# Patient Record
Sex: FEMALE | Race: WHITE | NOT HISPANIC OR LATINO | Employment: OTHER | URBAN - NONMETROPOLITAN AREA
[De-identification: names, ages, dates, MRNs, and addresses within clinical notes are randomized per-mention and may not be internally consistent; named-entity substitution may affect disease eponyms.]

---

## 2017-01-04 ENCOUNTER — OFFICE VISIT (OUTPATIENT)
Dept: OTOLARYNGOLOGY | Facility: CLINIC | Age: 77
End: 2017-01-04

## 2017-01-04 VITALS
HEART RATE: 100 BPM | TEMPERATURE: 98.2 F | DIASTOLIC BLOOD PRESSURE: 88 MMHG | HEIGHT: 64 IN | WEIGHT: 98 LBS | SYSTOLIC BLOOD PRESSURE: 121 MMHG | BODY MASS INDEX: 16.73 KG/M2

## 2017-01-04 DIAGNOSIS — M35.00 SICCA (HCC): Primary | ICD-10-CM

## 2017-01-04 DIAGNOSIS — K11.7 XEROSTOMIA: ICD-10-CM

## 2017-01-04 DIAGNOSIS — L43.9 LICHEN PLANUS: ICD-10-CM

## 2017-01-04 DIAGNOSIS — K12.1 MOUTH ULCERS: ICD-10-CM

## 2017-01-04 PROCEDURE — 1036F TOBACCO NON-USER: CPT | Performed by: OTOLARYNGOLOGY

## 2017-01-04 PROCEDURE — 99213 OFFICE O/P EST LOW 20 MIN: CPT | Performed by: OTOLARYNGOLOGY

## 2017-01-04 NOTE — MR AVS SNAPSHOT
Olive Bowman   1/4/2017 9:15 AM   Office Visit    Dept Phone:  [phone]   Encounter #:  [id number]    Provider:  Arnel Amaral MD   Department:  [facility]                Your Full Care Plan              Your Updated Medication List          This list is accurate as of: 1/4/17 11:12 AM.  Always use your most recent med list.                ALPRAZolam 0.5 MG tablet   Commonly known as:  XANAX       cholecalciferol 1000 UNITS tablet   Commonly known as:  VITAMIN D3       dexlansoprazole 60 MG capsule   Commonly known as:  DEXILANT       estradiol 1 MG tablet   Commonly known as:  ESTRACE       HYDROcodone-acetaminophen 7.5-325 MG per tablet   Commonly known as:  NORCO       latanoprost 0.005 % ophthalmic solution   Commonly known as:  XALATAN       lidocaine-Maalox-diphenhydramine 900 mL suspension   Commonly known as:  MIRACLE MOUTHWASH       orphenadrine 100 MG 12 hr tablet   Commonly known as:  NORFLEX       pilocarpine 5 MG tablet   Commonly known as:  SALAGEN       PROBIOTIC PO       vitamin B-12 1000 MCG tablet   Commonly known as:  CYANOCOBALAMIN       vitamin C 500 MG tablet   Commonly known as:  ASCORBIC ACID       vitamin D 52167 UNITS capsule capsule   Commonly known as:  ERGOCALCIFEROL       vitamin E 1000 UNIT capsule               You Were Diagnosed With        Codes Comments    Sicca    -  Primary ICD-10-CM: M35.00  ICD-9-CM: 710.2     Mouth ulcers     ICD-10-CM: K12.1  ICD-9-CM: 528.9     Xerostomia     ICD-10-CM: K11.7  ICD-9-CM: 527.7     Lichen planus     ICD-10-CM: L43.9  ICD-9-CM: 697.0       Instructions     None    Patient Instructions History      Upcoming Appointments     Visit Type Date Time Department    FOLLOW UP 1/4/2017  9:15 AM [facility] TAYLA Lopezt Signup     Our records indicate that you have declined [facility] MyChart signup. If you would like to sign up for MyChart, please email [email or web address] or call  "[phone] to obtain an activation code.             Other Info from Your Visit           Allergies     Latex        Reason for Visit     Follow-up Biopsy Follow Up      Vital Signs     Blood Pressure Pulse Temperature Height Weight Body Mass Index    121/88 100 98.2 °F (36.8 °C) 64\" (162.6 cm) 98 lb (44.5 kg) 16.82 kg/m2    Smoking Status                   Former Smoker           Problems and Diagnoses Noted     Skin inflammation    Mouth ulcers    Sicca    Disturbance of salivary secretion        "

## 2017-01-04 NOTE — PROGRESS NOTES
PRIMARY CARE PROVIDER: Paulino Ortiz MD  REFERRING PROVIDER: No ref. provider found    Chief Complaint   Patient presents with   • Follow-up     Biopsy Follow Up       Subjective   History of Present Illness:  Olive Bowman is a  76 y.o.  female who presents for follow-up of her minor salivary gland biopsy.  This was negative for Sjogren's disease.  She has had issues with severe yeast infections and vaginitis, skin lesions, and recurring mouth sores.  She has had previous biopsies of her skin lesions that if shown lichen planus.  She currently has a dry mouth but no oral lesions.    Review of Systems:  Review of Systems   Constitutional: Positive for activity change, appetite change and fatigue. Negative for chills and fever.   HENT:        See HPI   Respiratory: Positive for cough. Negative for shortness of breath.    Cardiovascular: Negative for palpitations.   Gastrointestinal: Positive for nausea. Negative for vomiting.   Allergic/Immunologic: Negative.    Neurological: Positive for headaches. Negative for dizziness.   Psychiatric/Behavioral: Negative.        Past History:  Past Medical History   Diagnosis Date   • Arthralgia    • Fibromyalgia    • Lichen planus    • Mouth ulcers 11/18/2016   • Osteoporosis    • Primary osteoarthritis of both hands    • Sicca    • Ulcerative colitis    • Vitamin D deficiency    • Xerostomia 11/19/2016     Past Surgical History   Procedure Laterality Date   • Hysterectomy     • Breast lumpectomy     • Cholecystectomy       Family History   Problem Relation Age of Onset   • Cancer Mother    • Diabetes Paternal Grandmother    • Diabetes Paternal Grandfather      Social History   Substance Use Topics   • Smoking status: Former Smoker     Quit date: 1979   • Smokeless tobacco: Never Used   • Alcohol use 2.4 oz/week     4 Glasses of wine per week       Current Outpatient Prescriptions:   •  ALPRAZolam (XANAX) 0.5 MG tablet, Take 0.5 mg by mouth 2 (Two) Times a Day  As Needed for anxiety., Disp: , Rfl:   •  cholecalciferol (VITAMIN D3) 1000 UNITS tablet, Take 1,000 Units by mouth Daily., Disp: , Rfl:   •  dexlansoprazole (DEXILANT) 60 MG capsule, Take 60 mg by mouth Daily., Disp: , Rfl:   •  estradiol (ESTRACE) 1 MG tablet, Take 1 mg by mouth Daily., Disp: , Rfl:   •  HYDROcodone-acetaminophen (NORCO) 7.5-325 MG per tablet, Take 1 tablet by mouth Every 6 (Six) Hours As Needed for moderate pain (4-6)., Disp: , Rfl:   •  latanoprost (XALATAN) 0.005 % ophthalmic solution, 1 drop Every Night., Disp: , Rfl:   •  lidocaine-Maalox-diphenhydramine (MIRACLE MOUTHWASH) 900 mL suspension, Take 30 mL by mouth 4 (Four) Times a Day As Needed., Disp: , Rfl:   •  orphenadrine (NORFLEX) 100 MG 12 hr tablet, Take 100 mg by mouth 2 (Two) Times a Day., Disp: , Rfl:   •  pilocarpine (SALAGEN) 5 MG tablet, Take 5 mg by mouth 3 (Three) Times a Day., Disp: , Rfl:   •  Probiotic Product (PROBIOTIC PO), Take  by mouth., Disp: , Rfl:   •  vitamin B-12 (CYANOCOBALAMIN) 1000 MCG tablet, Take 1,000 mcg by mouth Daily., Disp: , Rfl:   •  vitamin C (ASCORBIC ACID) 500 MG tablet, Take 1,000 mg by mouth Daily., Disp: , Rfl:   •  vitamin D (ERGOCALCIFEROL) 02687 UNITS capsule capsule, Take 50,000 Units by mouth Every 30 (Thirty) Days., Disp: , Rfl:   •  vitamin E 1000 UNIT capsule, Take 1,000 Units by mouth Daily., Disp: , Rfl:   Allergies:  Latex    Objective     Vital Signs:  Temp:  [98.2 °F (36.8 °C)] 98.2 °F (36.8 °C)  Heart Rate:  [100] 100  BP: (121)/(88) 121/88    Physical Exam:  CONSTITUTIONAL: well nourished, well-developed, alert, oriented, in no acute distress  COMMUNICATION AND VOICE: able to communicate normally for age, normal voice quality  HEAD: normocephalic, no lesions, atraumatic, no tenderness, no masses  FACE: appearance normal, no lesions, no tenderness, no deformities, facial motion symmetric  EYES: ocular motility normal, eyelids normal, orbits normal, no proptosis, conjunctiva normal ,  pupils equal, round  EARS:  Hearing: response to conversational voice normal bilaterally  External Ears: auricles without lesions  NOSE:  External Nose: structure normal, no tenderness on palpation, no nasal discharge, no lesions, no evidence of trauma, nostrils patent  ORAL:  Lips: upper and lower lips without lesion, incision site is well-healed  Oral cavity: The mouth is very dry.  There is no mucosal lesions seen.  NECK: neck appearance normal  CHEST/RESPIRATORY: respiratory effort normal, normal chest excursion  CARDIOVASCULAR: extremities without cyanosis or edema  NEUROLOGIC/PSYCHIATRIC: oriented appropriately, mood normal, affect appropriate, CN II-XII intact grossly      Results Review:   The pathology report was reviewed and showed no evidence of Sjogren's disease.      Assessment   1. Sicca    2. Mouth ulcers    3. Xerostomia    4. Lichen planus        Plan   Conservative management.  She has no Sjogren's disease on the minor salivary gland biopsy.  She reports that Dr. Ortiz was wanting a biopsy of one of her mouth ulcerations to rule out lichen planus.  She has had a dermatologic biopsy in the past that did confirm lichen planus.  He currently does not have mouth ulcerations.  I attempted to discuss this with Dr. Ortiz but he was not available.  For the time being we will watch this and if there is an ulcer that comes up and if Dr. Herrera wants it biopsied, we will certainly be able to do that.  However I feel that this more likely represents lichen planus given her previous dermatologic biopsy.      Arnel Amaral MD  01/04/17  10:20 AM

## 2017-04-19 ENCOUNTER — TRANSCRIBE ORDERS (OUTPATIENT)
Dept: ADMINISTRATIVE | Facility: HOSPITAL | Age: 77
End: 2017-04-19

## 2017-04-19 DIAGNOSIS — Z12.31 VISIT FOR SCREENING MAMMOGRAM: Primary | ICD-10-CM

## 2018-05-25 ENCOUNTER — TRANSCRIBE ORDERS (OUTPATIENT)
Dept: ADMINISTRATIVE | Facility: HOSPITAL | Age: 78
End: 2018-05-25

## 2018-05-25 DIAGNOSIS — Z12.31 ENCOUNTER FOR SCREENING MAMMOGRAM FOR MALIGNANT NEOPLASM OF BREAST: Primary | ICD-10-CM

## 2018-05-25 DIAGNOSIS — Z78.0 POSTMENOPAUSAL: ICD-10-CM

## 2018-06-01 ENCOUNTER — APPOINTMENT (OUTPATIENT)
Dept: BONE DENSITY | Facility: HOSPITAL | Age: 78
End: 2018-06-01
Attending: INTERNAL MEDICINE

## 2018-06-01 ENCOUNTER — APPOINTMENT (OUTPATIENT)
Dept: MAMMOGRAPHY | Facility: HOSPITAL | Age: 78
End: 2018-06-01
Attending: INTERNAL MEDICINE

## 2018-07-13 ENCOUNTER — HOSPITAL ENCOUNTER (OUTPATIENT)
Dept: MAMMOGRAPHY | Facility: HOSPITAL | Age: 78
Discharge: HOME | End: 2018-07-13
Attending: INTERNAL MEDICINE | Admitting: INTERNAL MEDICINE

## 2018-07-13 ENCOUNTER — HOSPITAL ENCOUNTER (OUTPATIENT)
Dept: BONE DENSITY | Facility: HOSPITAL | Age: 78
Discharge: HOME | End: 2018-07-13
Attending: INTERNAL MEDICINE

## 2018-07-13 DIAGNOSIS — Z12.31 ENCOUNTER FOR SCREENING MAMMOGRAM FOR MALIGNANT NEOPLASM OF BREAST: ICD-10-CM

## 2018-07-13 DIAGNOSIS — Z78.0 POSTMENOPAUSAL: ICD-10-CM

## 2018-07-13 PROCEDURE — 77080 DXA BONE DENSITY AXIAL: CPT

## 2018-07-13 PROCEDURE — 77063 BREAST TOMOSYNTHESIS BI: CPT

## 2018-07-13 PROCEDURE — 77067 SCR MAMMO BI INCL CAD: CPT

## 2018-09-05 ENCOUNTER — OFFICE VISIT (OUTPATIENT)
Dept: GASTROENTEROLOGY | Facility: CLINIC | Age: 78
End: 2018-09-05

## 2018-09-05 VITALS
WEIGHT: 98 LBS | SYSTOLIC BLOOD PRESSURE: 138 MMHG | OXYGEN SATURATION: 95 % | HEIGHT: 64 IN | BODY MASS INDEX: 16.73 KG/M2 | DIASTOLIC BLOOD PRESSURE: 72 MMHG | HEART RATE: 75 BPM

## 2018-09-05 DIAGNOSIS — R10.13 DYSPEPSIA: Primary | ICD-10-CM

## 2018-09-05 DIAGNOSIS — Z78.9 NONSMOKER: ICD-10-CM

## 2018-09-05 PROCEDURE — 99212 OFFICE O/P EST SF 10 MIN: CPT | Performed by: CLINICAL NURSE SPECIALIST

## 2018-09-05 RX ORDER — ALUMINA, MAGNESIA, AND SIMETHICONE 2400; 2400; 240 MG/30ML; MG/30ML; MG/30ML
SUSPENSION ORAL EVERY 6 HOURS PRN
Status: ON HOLD | COMMUNITY
End: 2019-06-07

## 2018-09-05 RX ORDER — OMEPRAZOLE 20 MG/1
20 CAPSULE, DELAYED RELEASE ORAL DAILY
Qty: 30 CAPSULE | Refills: 11 | Status: ON HOLD | OUTPATIENT
Start: 2018-09-05 | End: 2019-06-17

## 2018-09-05 NOTE — PROGRESS NOTES
Olive Bowman  1940    9/5/2018  Chief Complaint   Patient presents with   • GI Problem     Colitis     Subjective   HPI  Olive Bowman is a 77 y.o. female who presents with a recent flare of her reflux and indigestion. She says that it is better at this time. She is not taking anything for this at this time. She takes Gaviscon as needed and this helps her.  She only takes her PPI as needed. No nausea or vomiting. No dysphagia. She also struggles with Lichen planus. This is fairly controlled at this time. She does struggle with her weight and in continuing to supplement with Boost and shakes. She is working with her PCP regarding this as well.   Past Medical History:   Diagnosis Date   • Arthralgia    • Fibromyalgia    • Lichen planus    • Mouth ulcers 11/18/2016   • Osteoporosis    • Primary osteoarthritis of both hands    • Sicca (CMS/HCC)    • Ulcerative colitis (CMS/HCC)    • Vitamin D deficiency    • Xerostomia 11/19/2016     Past Surgical History:   Procedure Laterality Date   • BREAST EXCISIONAL BIOPSY Right     x 2 benign   • BREAST EXCISIONAL BIOPSY Left     x 1 benign   • BREAST LUMPECTOMY     • CHOLECYSTECTOMY     • COLONOSCOPY W/ BIOPSIES  05/22/2013    Changes consistent with collagenous colitis   • ENDOSCOPY  02/04/2015    Dilated normal exam   • HYSTERECTOMY       Outpatient Prescriptions Marked as Taking for the 9/5/18 encounter (Office Visit) with Neela Hurst APRN   Medication Sig Dispense Refill   • ALPRAZolam (XANAX) 0.5 MG tablet Take 0.5 mg by mouth 2 (Two) Times a Day As Needed for anxiety.     • aluminum-magnesium hydroxide-simethicone (MAALOX MAX) 400-400-40 MG/5ML suspension Take  by mouth Every 6 (Six) Hours As Needed for Indigestion or Heartburn.     • estradiol (ESTRACE) 1 MG tablet Take 1 mg by mouth Daily.     • HYDROcodone-acetaminophen (NORCO) 7.5-325 MG per tablet Take 1 tablet by mouth Every 6 (Six) Hours As Needed for moderate pain (4-6).     • latanoprost  (XALATAN) 0.005 % ophthalmic solution 1 drop Every Night.     • lidocaine-Maalox-diphenhydramine (MIRACLE MOUTHWASH) 900 mL suspension Take 30 mL by mouth 4 (Four) Times a Day As Needed.     • orphenadrine (NORFLEX) 100 MG 12 hr tablet Take 100 mg by mouth 2 (Two) Times a Day.     • pilocarpine (SALAGEN) 5 MG tablet Take 5 mg by mouth 3 (Three) Times a Day.     • Probiotic Product (PROBIOTIC PO) Take  by mouth.     • vitamin B-12 (CYANOCOBALAMIN) 1000 MCG tablet Take 1,000 mcg by mouth Daily.     • vitamin C (ASCORBIC ACID) 500 MG tablet Take 1,000 mg by mouth Daily.     • vitamin D (ERGOCALCIFEROL) 45237 UNITS capsule capsule Take 50,000 Units by mouth Every 30 (Thirty) Days.     • vitamin E 1000 UNIT capsule Take 1,000 Units by mouth Daily.       Allergies   Allergen Reactions   • Latex      Social History     Social History   • Marital status:      Spouse name: N/A   • Number of children: N/A   • Years of education: N/A     Occupational History   • Not on file.     Social History Main Topics   • Smoking status: Former Smoker     Quit date: 1979   • Smokeless tobacco: Never Used   • Alcohol use 2.4 oz/week     4 Glasses of wine per week   • Drug use: Unknown   • Sexual activity: Not on file     Other Topics Concern   • Not on file     Social History Narrative   • No narrative on file     Family History   Problem Relation Age of Onset   • Cancer Mother    • Diabetes Paternal Grandmother    • Diabetes Paternal Grandfather    • Breast cancer Neg Hx    • Colon cancer Neg Hx    • Colon polyps Neg Hx      Health Maintenance   Topic Date Due   • TDAP/TD VACCINES (1 - Tdap) 10/23/1961   • ZOSTER VACCINE (1 of 2) 10/23/1992   • PNEUMOCOCCAL VACCINES (65+ LOW/MEDIUM RISK) (2 of 2 - PPSV23) 03/24/2017   • INFLUENZA VACCINE  08/01/2018   • MEDICARE ANNUAL WELLNESS  05/25/2019   • DXA SCAN  07/13/2020   • COLONOSCOPY  05/22/2023     Review of Systems   Constitutional: Negative for activity change, appetite change,  "chills, diaphoresis, fatigue, fever and unexpected weight change.   HENT: Negative for ear pain, hearing loss, mouth sores, sore throat, trouble swallowing and voice change.    Eyes: Negative.    Respiratory: Negative for cough, choking, shortness of breath and wheezing.    Cardiovascular: Negative for chest pain and palpitations.   Gastrointestinal: Negative for abdominal pain, blood in stool, constipation, diarrhea, nausea and vomiting.   Endocrine: Negative for cold intolerance and heat intolerance.   Genitourinary: Negative for decreased urine volume, dysuria, frequency, hematuria and urgency.   Musculoskeletal: Negative for back pain, gait problem and myalgias.   Skin: Negative for color change, pallor and rash.   Allergic/Immunologic: Negative for food allergies and immunocompromised state.   Neurological: Negative for dizziness, tremors, seizures, syncope, weakness, light-headedness, numbness and headaches.   Hematological: Negative for adenopathy. Does not bruise/bleed easily.   Psychiatric/Behavioral: Negative for agitation and confusion. The patient is not nervous/anxious.    All other systems reviewed and are negative.    Objective   Vitals:    09/05/18 0940   BP: 138/72   Pulse: 75   SpO2: 95%   Weight: 44.5 kg (98 lb)   Height: 162.6 cm (64\")     Body mass index is 16.82 kg/m².  Physical Exam   Constitutional: She is oriented to person, place, and time. She appears well-developed and well-nourished.   HENT:   Head: Normocephalic and atraumatic.   Eyes: Pupils are equal, round, and reactive to light.   Neck: Normal range of motion. Neck supple. No tracheal deviation present.   Cardiovascular: Normal rate, regular rhythm and normal heart sounds.  Exam reveals no gallop and no friction rub.    No murmur heard.  Pulmonary/Chest: Effort normal and breath sounds normal. No respiratory distress. She has no wheezes. She has no rales. She exhibits no tenderness.   Abdominal: Soft. Bowel sounds are normal. She " exhibits no distension. There is no hepatosplenomegaly. There is no tenderness. There is no rigidity, no rebound and no guarding.   Musculoskeletal: Normal range of motion. She exhibits no edema, tenderness or deformity.   Neurological: She is alert and oriented to person, place, and time. She has normal reflexes.   Skin: Skin is warm and dry. No rash noted. No pallor.   Psychiatric: She has a normal mood and affect. Her behavior is normal. Judgment and thought content normal.     Assessment/Plan   Olive was seen today for gi problem.    Diagnoses and all orders for this visit:    Dyspepsia  Comments:  she is stable at this time. she is to take Gaviscon as needed and Omeprazole if persistent for more than a few weeks.   Orders:  -     omeprazole (priLOSEC) 20 MG capsule; Take 1 capsule by mouth Daily.    Nonsmoker    She is to keep follow up with her PCP and rheumatologist.     EMR Dragon/transcription disclaimer: Much of this encounter note is electronic transcription/translation of spoken language to printed text. The electronic translation of spoken language may be erroneous, or at times, nonsensical words or phrases may be inadvertently transcribed. Although I have reviewed the note for such errors, some may still exist.  Body mass index is 16.82 kg/m².  No Follow-up on file.    Patient's Body mass index is 16.82 kg/m². BMI is below normal parameters. Recommendations include: referral to primary care.      All risks, benefits, alternatives, and indications of colonoscopy and/or Endoscopy procedure have been discussed with the patient. Risks to include perforation of the colon requiring possible surgery or colostomy, risk of bleeding from biopsies or removal of colon tissue, possibility of missing a colon polyp or cancer, or adverse drug reaction.  Benefits to include the diagnosis and management of disease of the colon and rectum. Alternatives to include barium enema, radiographic evaluation, lab testing or no  intervention. Pt verbalizes understanding and agrees.     Neela Ansley Hurst, APRN  9/5/2018  10:24 AM      Obesity, Adult  Obesity is the condition of having too much total body fat. Being overweight or obese means that your weight is greater than what is considered healthy for your body size. Obesity is determined by a measurement called BMI. BMI is an estimate of body fat and is calculated from height and weight. For adults, a BMI of 30 or higher is considered obese.  Obesity can eventually lead to other health concerns and major illnesses, including:  · Stroke.  · Coronary artery disease (CAD).  · Type 2 diabetes.  · Some types of cancer, including cancers of the colon, breast, uterus, and gallbladder.  · Osteoarthritis.  · High blood pressure (hypertension).  · High cholesterol.  · Sleep apnea.  · Gallbladder stones.  · Infertility problems.  What are the causes?  The main cause of obesity is taking in (consuming) more calories than your body uses for energy. Other factors that contribute to this condition may include:  · Being born with genes that make you more likely to become obese.  · Having a medical condition that causes obesity. These conditions include:  ¨ Hypothyroidism.  ¨ Polycystic ovarian syndrome (PCOS).  ¨ Binge-eating disorder.  ¨ Cushing syndrome.  · Taking certain medicines, such as steroids, antidepressants, and seizure medicines.  · Not being physically active (sedentary lifestyle).  · Living where there are limited places to exercise safely or buy healthy foods.  · Not getting enough sleep.  What increases the risk?  The following factors may increase your risk of this condition:  · Having a family history of obesity.  · Being a woman of -American descent.  · Being a man of  descent.  What are the signs or symptoms?  Having excessive body fat is the main symptom of this condition.  How is this diagnosed?  This condition may be diagnosed based on:  · Your symptoms.  · Your  medical history.  · A physical exam. Your health care provider may measure:  ¨ Your BMI. If you are an adult with a BMI between 25 and less than 30, you are considered overweight. If you are an adult with a BMI of 30 or higher, you are considered obese.  ¨ The distances around your hips and your waist (circumferences). These may be compared to each other to help diagnose your condition.  ¨ Your skinfold thickness. Your health care provider may gently pinch a fold of your skin and measure it.  How is this treated?  Treatment for this condition often includes changing your lifestyle. Treatment may include some or all of the following:  · Dietary changes. Work with your health care provider and a dietitian to set a weight-loss goal that is healthy and reasonable for you. Dietary changes may include eating:  ¨ Smaller portions. A portion size is the amount of a particular food that is healthy for you to eat at one time. This varies from person to person.  ¨ Low-calorie or low-fat options.  ¨ More whole grains, fruits, and vegetables.  · Regular physical activity. This may include aerobic activity (cardio) and strength training.  · Medicine to help you lose weight. Your health care provider may prescribe medicine if you are unable to lose 1 pound a week after 6 weeks of eating more healthily and doing more physical activity.  · Surgery. Surgical options may include gastric banding and gastric bypass. Surgery may be done if:  ¨ Other treatments have not helped to improve your condition.  ¨ You have a BMI of 40 or higher.  ¨ You have life-threatening health problems related to obesity.  Follow these instructions at home:     Eating and drinking     · Follow recommendations from your health care provider about what you eat and drink. Your health care provider may advise you to:  ¨ Limit fast foods, sweets, and processed snack foods.  ¨ Choose low-fat options, such as low-fat milk instead of whole milk.  ¨ Eat 5 or more  servings of fruits or vegetables every day.  ¨ Eat at home more often. This gives you more control over what you eat.  ¨ Choose healthy foods when you eat out.  ¨ Learn what a healthy portion size is.  ¨ Keep low-fat snacks on hand.  ¨ Avoid sugary drinks, such as soda, fruit juice, iced tea sweetened with sugar, and flavored milk.  ¨ Eat a healthy breakfast.  · Drink enough water to keep your urine clear or pale yellow.  · Do not go without eating for long periods of time (do not fast) or follow a fad diet. Fasting and fad diets can be unhealthy and even dangerous.  Physical Activity   · Exercise regularly, as told by your health care provider. Ask your health care provider what types of exercise are safe for you and how often you should exercise.  · Warm up and stretch before being active.  · Cool down and stretch after being active.  · Rest between periods of activity.  Lifestyle   · Limit the time that you spend in front of your TV, computer, or video game system.  · Find ways to reward yourself that do not involve food.  · Limit alcohol intake to no more than 1 drink a day for nonpregnant women and 2 drinks a day for men. One drink equals 12 oz of beer, 5 oz of wine, or 1½ oz of hard liquor.  General instructions   · Keep a weight loss journal to keep track of the food you eat and how much you exercise you get.  · Take over-the-counter and prescription medicines only as told by your health care provider.  · Take vitamins and supplements only as told by your health care provider.  · Consider joining a support group. Your health care provider may be able to recommend a support group.  · Keep all follow-up visits as told by your health care provider. This is important.  Contact a health care provider if:  · You are unable to meet your weight loss goal after 6 weeks of dietary and lifestyle changes.  This information is not intended to replace advice given to you by your health care provider. Make sure you discuss  any questions you have with your health care provider.  Document Released: 01/25/2006 Document Revised: 05/22/2017 Document Reviewed: 10/05/2016  [id number] Interactive Patient Education © 2017 [employer]      If you smoke or use tobacco, 4 minutes reading provided  Steps to Quit Smoking  Smoking tobacco can be harmful to your health and can affect almost every organ in your body. Smoking puts you, and those around you, at risk for developing many serious chronic diseases. Quitting smoking is difficult, but it is one of the best things that you can do for your health. It is never too late to quit.  What are the benefits of quitting smoking?  When you quit smoking, you lower your risk of developing serious diseases and conditions, such as:  · Lung cancer or lung disease, such as COPD.  · Heart disease.  · Stroke.  · Heart attack.  · Infertility.  · Osteoporosis and bone fractures.  Additionally, symptoms such as coughing, wheezing, and shortness of breath may get better when you quit. You may also find that you get sick less often because your body is stronger at fighting off colds and infections. If you are pregnant, quitting smoking can help to reduce your chances of having a baby of low birth weight.  How do I get ready to quit?  When you decide to quit smoking, create a plan to make sure that you are successful. Before you quit:  · Pick a date to quit. Set a date within the next two weeks to give you time to prepare.  · Write down the reasons why you are quitting. Keep this list in places where you will see it often, such as on your bathroom mirror or in your car or wallet.  · Identify the people, places, things, and activities that make you want to smoke (triggers) and avoid them. Make sure to take these actions:  ¨ Throw away all cigarettes at home, at work, and in your car.  ¨ Throw away smoking accessories, such as ashtrays and lighters.  ¨ Clean your car and make sure to empty the ashtray.  ¨ Clean your  home, including curtains and carpets.  · Tell your family, friends, and coworkers that you are quitting. Support from your loved ones can make quitting easier.  · Talk with your health care provider about your options for quitting smoking.  · Find out what treatment options are covered by your health insurance.  What strategies can I use to quit smoking?  Talk with your healthcare provider about different strategies to quit smoking. Some strategies include:  · Quitting smoking altogether instead of gradually lessening how much you smoke over a period of time. Research shows that quitting “cold turkey” is more successful than gradually quitting.  · Attending in-person counseling to help you build problem-solving skills. You are more likely to have success in quitting if you attend several counseling sessions. Even short sessions of 10 minutes can be effective.  · Finding resources and support systems that can help you to quit smoking and remain smoke-free after you quit. These resources are most helpful when you use them often. They can include:  ¨ Online chats with a counselor.  ¨ Telephone quitlines.  ¨ Printed self-help materials.  ¨ Support groups or group counseling.  ¨ Text messaging programs.  ¨ Mobile phone applications.  · Taking medicines to help you quit smoking. (If you are pregnant or breastfeeding, talk with your health care provider first.) Some medicines contain nicotine and some do not. Both types of medicines help with cravings, but the medicines that include nicotine help to relieve withdrawal symptoms. Your health care provider may recommend:  ¨ Nicotine patches, gum, or lozenges.  ¨ Nicotine inhalers or sprays.  ¨ Non-nicotine medicine that is taken by mouth.  Talk with your health care provider about combining strategies, such as taking medicines while you are also receiving in-person counseling. Using these two strategies together makes you more likely to succeed in quitting than if you used  either strategy on its own.  If you are pregnant or breastfeeding, talk with your health care provider about finding counseling or other support strategies to quit smoking. Do not take medicine to help you quit smoking unless told to do so by your health care provider.  What things can I do to make it easier to quit?  Quitting smoking might feel overwhelming at first, but there is a lot that you can do to make it easier. Take these important actions:  · Reach out to your family and friends and ask that they support and encourage you during this time. Call telephone quitlines, reach out to support groups, or work with a counselor for support.  · Ask people who smoke to avoid smoking around you.  · Avoid places that trigger you to smoke, such as bars, parties, or smoke-break areas at work.  · Spend time around people who do not smoke.  · Lessen stress in your life, because stress can be a smoking trigger for some people. To lessen stress, try:  ¨ Exercising regularly.  ¨ Deep-breathing exercises.  ¨ Yoga.  ¨ Meditating.  ¨ Performing a body scan. This involves closing your eyes, scanning your body from head to toe, and noticing which parts of your body are particularly tense. Purposefully relax the muscles in those areas.  · Download or purchase mobile phone or tablet apps (applications) that can help you stick to your quit plan by providing reminders, tips, and encouragement. There are many free apps, such as QuitGuide from the CDC (Centers for Disease Control and Prevention). You can find other support for quitting smoking (smoking cessation) through smokefree.gov and other websites.  How will I feel when I quit smoking?  Within the first 24 hours of quitting smoking, you may start to feel some withdrawal symptoms. These symptoms are usually most noticeable 2-3 days after quitting, but they usually do not last beyond 2-3 weeks. Changes or symptoms that you might experience include:  · Mood swings.  · Restlessness,  anxiety, or irritation.  · Difficulty concentrating.  · Dizziness.  · Strong cravings for sugary foods in addition to nicotine.  · Mild weight gain.  · Constipation.  · Nausea.  · Coughing or a sore throat.  · Changes in how your medicines work in your body.  · A depressed mood.  · Difficulty sleeping (insomnia).  After the first 2-3 weeks of quitting, you may start to notice more positive results, such as:  · Improved sense of smell and taste.  · Decreased coughing and sore throat.  · Slower heart rate.  · Lower blood pressure.  · Clearer skin.  · The ability to breathe more easily.  · Fewer sick days.  Quitting smoking is very challenging for most people. Do not get discouraged if you are not successful the first time. Some people need to make many attempts to quit before they achieve long-term success. Do your best to stick to your quit plan, and talk with your health care provider if you have any questions or concerns.  This information is not intended to replace advice given to you by your health care provider. Make sure you discuss any questions you have with your health care provider.  Document Released: 12/12/2002 Document Revised: 08/15/2017 Document Reviewed: 05/03/2016  [employer] Interactive Patient Education © 2017 [employer]

## 2018-10-02 LAB
ALBUMIN SERPL-MCNC: 4.5 G/DL (ref 3.5–5.2)
ALP BLD-CCNC: 76 U/L (ref 35–104)
ALT SERPL-CCNC: 12 U/L (ref 5–33)
ANION GAP SERPL CALCULATED.3IONS-SCNC: 13 MMOL/L (ref 7–19)
AST SERPL-CCNC: 20 U/L (ref 5–32)
BASOPHILS ABSOLUTE: 0.1 K/UL (ref 0–0.2)
BASOPHILS RELATIVE PERCENT: 1 % (ref 0–1)
BILIRUB SERPL-MCNC: <0.2 MG/DL (ref 0.2–1.2)
BUN BLDV-MCNC: 23 MG/DL (ref 8–23)
C-REACTIVE PROTEIN: 0.4 MG/DL (ref 0–0.5)
CALCIUM SERPL-MCNC: 9.7 MG/DL (ref 8.8–10.2)
CHLORIDE BLD-SCNC: 99 MMOL/L (ref 98–111)
CO2: 28 MMOL/L (ref 22–29)
CREAT SERPL-MCNC: 1 MG/DL (ref 0.5–0.9)
EOSINOPHILS ABSOLUTE: 0.2 K/UL (ref 0–0.6)
EOSINOPHILS RELATIVE PERCENT: 2.6 % (ref 0–5)
GFR NON-AFRICAN AMERICAN: 54
GLUCOSE BLD-MCNC: 97 MG/DL (ref 74–109)
HCT VFR BLD CALC: 34.4 % (ref 37–47)
HEMOGLOBIN: 11 G/DL (ref 12–16)
LYMPHOCYTES ABSOLUTE: 1.5 K/UL (ref 1.1–4.5)
LYMPHOCYTES RELATIVE PERCENT: 18.6 % (ref 20–40)
MCH RBC QN AUTO: 32.2 PG (ref 27–31)
MCHC RBC AUTO-ENTMCNC: 32 G/DL (ref 33–37)
MCV RBC AUTO: 100.6 FL (ref 81–99)
MONOCYTES ABSOLUTE: 0.6 K/UL (ref 0–0.9)
MONOCYTES RELATIVE PERCENT: 7.1 % (ref 0–10)
NEUTROPHILS ABSOLUTE: 5.6 K/UL (ref 1.5–7.5)
NEUTROPHILS RELATIVE PERCENT: 70.3 % (ref 50–65)
PDW BLD-RTO: 12.5 % (ref 11.5–14.5)
PLATELET # BLD: 197 K/UL (ref 130–400)
PMV BLD AUTO: 9.6 FL (ref 9.4–12.3)
POTASSIUM SERPL-SCNC: 4.9 MMOL/L (ref 3.5–5)
RBC # BLD: 3.42 M/UL (ref 4.2–5.4)
SEDIMENTATION RATE, ERYTHROCYTE: 8 MM/HR (ref 0–25)
SODIUM BLD-SCNC: 140 MMOL/L (ref 136–145)
TOTAL PROTEIN: 6.8 G/DL (ref 6.6–8.7)
WBC # BLD: 7.9 K/UL (ref 4.8–10.8)

## 2018-10-04 LAB — G-6-PD, QUANT: 11.4 U/G HB (ref 9.9–16.6)

## 2018-11-16 LAB
ALBUMIN SERPL-MCNC: 4.2 G/DL (ref 3.5–5.2)
ALP BLD-CCNC: 65 U/L (ref 35–104)
ALT SERPL-CCNC: 18 U/L (ref 5–33)
ANION GAP SERPL CALCULATED.3IONS-SCNC: 13 MMOL/L (ref 7–19)
AST SERPL-CCNC: 26 U/L (ref 5–32)
BASOPHILS ABSOLUTE: 0.1 K/UL (ref 0–0.2)
BASOPHILS RELATIVE PERCENT: 0.6 % (ref 0–1)
BILIRUB SERPL-MCNC: 0.4 MG/DL (ref 0.2–1.2)
BUN BLDV-MCNC: 33 MG/DL (ref 8–23)
CALCIUM SERPL-MCNC: 10.1 MG/DL (ref 8.8–10.2)
CHLORIDE BLD-SCNC: 98 MMOL/L (ref 98–111)
CO2: 28 MMOL/L (ref 22–29)
CREAT SERPL-MCNC: 1.2 MG/DL (ref 0.5–0.9)
EOSINOPHILS ABSOLUTE: 0.1 K/UL (ref 0–0.6)
EOSINOPHILS RELATIVE PERCENT: 0.9 % (ref 0–5)
GFR NON-AFRICAN AMERICAN: 44
GLUCOSE BLD-MCNC: 166 MG/DL (ref 74–109)
HCT VFR BLD CALC: 32.7 % (ref 37–47)
HEMOGLOBIN: 10.2 G/DL (ref 12–16)
LYMPHOCYTES ABSOLUTE: 1.5 K/UL (ref 1.1–4.5)
LYMPHOCYTES RELATIVE PERCENT: 17.9 % (ref 20–40)
MCH RBC QN AUTO: 32.6 PG (ref 27–31)
MCHC RBC AUTO-ENTMCNC: 31.2 G/DL (ref 33–37)
MCV RBC AUTO: 104.5 FL (ref 81–99)
MONOCYTES ABSOLUTE: 0.5 K/UL (ref 0–0.9)
MONOCYTES RELATIVE PERCENT: 5.9 % (ref 0–10)
NEUTROPHILS ABSOLUTE: 6.4 K/UL (ref 1.5–7.5)
NEUTROPHILS RELATIVE PERCENT: 74.4 % (ref 50–65)
PDW BLD-RTO: 12.9 % (ref 11.5–14.5)
PLATELET # BLD: 211 K/UL (ref 130–400)
PMV BLD AUTO: 9.5 FL (ref 9.4–12.3)
POTASSIUM SERPL-SCNC: 4.7 MMOL/L (ref 3.5–5)
RBC # BLD: 3.13 M/UL (ref 4.2–5.4)
SODIUM BLD-SCNC: 139 MMOL/L (ref 136–145)
TOTAL PROTEIN: 7 G/DL (ref 6.6–8.7)
VITAMIN B-12: 1306 PG/ML (ref 211–946)
WBC # BLD: 8.6 K/UL (ref 4.8–10.8)

## 2019-06-06 ENCOUNTER — TELEPHONE (OUTPATIENT)
Dept: GASTROENTEROLOGY | Facility: CLINIC | Age: 79
End: 2019-06-06

## 2019-06-06 ENCOUNTER — HOSPITAL ENCOUNTER (OUTPATIENT)
Facility: HOSPITAL | Age: 79
Discharge: HOME | End: 2019-06-07
Attending: FAMILY MEDICINE | Admitting: INTERNAL MEDICINE

## 2019-06-06 DIAGNOSIS — R13.10 ODYNOPHAGIA: ICD-10-CM

## 2019-06-06 DIAGNOSIS — E86.0 DEHYDRATION: Primary | ICD-10-CM

## 2019-06-06 DIAGNOSIS — R13.19 ESOPHAGEAL DYSPHAGIA: ICD-10-CM

## 2019-06-06 LAB
ALBUMIN SERPL-MCNC: 4.2 G/DL (ref 3.5–5)
ALBUMIN/GLOB SERPL: 1.4 G/DL (ref 1.1–2.5)
ALP SERPL-CCNC: 94 U/L (ref 24–120)
ALT SERPL W P-5'-P-CCNC: 37 U/L (ref 0–54)
ANION GAP SERPL CALCULATED.3IONS-SCNC: 8 MMOL/L (ref 4–13)
APTT PPP: 27.2 SECONDS (ref 24.1–35)
AST SERPL-CCNC: 39 U/L (ref 7–45)
BASOPHILS # BLD AUTO: 0.04 10*3/MM3 (ref 0–0.2)
BASOPHILS NFR BLD AUTO: 0.3 % (ref 0–2)
BILIRUB SERPL-MCNC: 0.3 MG/DL (ref 0.1–1)
BUN BLD-MCNC: 36 MG/DL (ref 5–21)
BUN/CREAT SERPL: 33 (ref 7–25)
CALCIUM SPEC-SCNC: 9.5 MG/DL (ref 8.4–10.4)
CHLORIDE SERPL-SCNC: 94 MMOL/L (ref 98–110)
CO2 SERPL-SCNC: 33 MMOL/L (ref 24–31)
CREAT BLD-MCNC: 1.09 MG/DL (ref 0.5–1.4)
DEPRECATED RDW RBC AUTO: 47 FL (ref 40–54)
EOSINOPHIL # BLD AUTO: 0.01 10*3/MM3 (ref 0–0.7)
EOSINOPHIL NFR BLD AUTO: 0.1 % (ref 0–4)
ERYTHROCYTE [DISTWIDTH] IN BLOOD BY AUTOMATED COUNT: 12.4 % (ref 12–15)
GFR SERPL CREATININE-BSD FRML MDRD: 49 ML/MIN/1.73
GLOBULIN UR ELPH-MCNC: 3 GM/DL
GLUCOSE BLD-MCNC: 66 MG/DL (ref 70–100)
HCT VFR BLD AUTO: 40.6 % (ref 37–47)
HGB BLD-MCNC: 13.1 G/DL (ref 12–16)
IMM GRANULOCYTES # BLD AUTO: 0.17 10*3/MM3 (ref 0–0.05)
IMM GRANULOCYTES NFR BLD AUTO: 1.2 % (ref 0–5)
INR PPP: 0.91 (ref 0.91–1.09)
LIPASE SERPL-CCNC: 64 U/L (ref 23–203)
LYMPHOCYTES # BLD AUTO: 1.02 10*3/MM3 (ref 0.72–4.86)
LYMPHOCYTES NFR BLD AUTO: 6.9 % (ref 15–45)
MCH RBC QN AUTO: 33 PG (ref 28–32)
MCHC RBC AUTO-ENTMCNC: 32.3 G/DL (ref 33–36)
MCV RBC AUTO: 102.3 FL (ref 82–98)
MONOCYTES # BLD AUTO: 1.05 10*3/MM3 (ref 0.19–1.3)
MONOCYTES NFR BLD AUTO: 7.1 % (ref 4–12)
NEUTROPHILS # BLD AUTO: 12.48 10*3/MM3 (ref 1.87–8.4)
NEUTROPHILS NFR BLD AUTO: 84.4 % (ref 39–78)
NRBC BLD AUTO-RTO: 0 /100 WBC (ref 0–0.2)
PLATELET # BLD AUTO: 281 10*3/MM3 (ref 130–400)
PMV BLD AUTO: 8.8 FL (ref 6–12)
POTASSIUM BLD-SCNC: 3.7 MMOL/L (ref 3.5–5.3)
PROT SERPL-MCNC: 7.2 G/DL (ref 6.3–8.7)
PROTHROMBIN TIME: 12.5 SECONDS (ref 11.9–14.6)
RBC # BLD AUTO: 3.97 10*6/MM3 (ref 4.2–5.4)
SODIUM BLD-SCNC: 135 MMOL/L (ref 135–145)
WBC NRBC COR # BLD: 14.77 10*3/MM3 (ref 4.8–10.8)

## 2019-06-06 PROCEDURE — 83690 ASSAY OF LIPASE: CPT | Performed by: FAMILY MEDICINE

## 2019-06-06 PROCEDURE — 96376 TX/PRO/DX INJ SAME DRUG ADON: CPT

## 2019-06-06 PROCEDURE — 80053 COMPREHEN METABOLIC PANEL: CPT | Performed by: FAMILY MEDICINE

## 2019-06-06 PROCEDURE — 25010000002 HYDROMORPHONE PER 4 MG: Performed by: INTERNAL MEDICINE

## 2019-06-06 PROCEDURE — 84100 ASSAY OF PHOSPHORUS: CPT | Performed by: INTERNAL MEDICINE

## 2019-06-06 PROCEDURE — 93010 ELECTROCARDIOGRAM REPORT: CPT | Performed by: INTERNAL MEDICINE

## 2019-06-06 PROCEDURE — 99284 EMERGENCY DEPT VISIT MOD MDM: CPT

## 2019-06-06 PROCEDURE — 96361 HYDRATE IV INFUSION ADD-ON: CPT

## 2019-06-06 PROCEDURE — 84484 ASSAY OF TROPONIN QUANT: CPT | Performed by: INTERNAL MEDICINE

## 2019-06-06 PROCEDURE — G0378 HOSPITAL OBSERVATION PER HR: HCPCS

## 2019-06-06 PROCEDURE — 85610 PROTHROMBIN TIME: CPT | Performed by: FAMILY MEDICINE

## 2019-06-06 PROCEDURE — 85730 THROMBOPLASTIN TIME PARTIAL: CPT | Performed by: FAMILY MEDICINE

## 2019-06-06 PROCEDURE — 96374 THER/PROPH/DIAG INJ IV PUSH: CPT

## 2019-06-06 PROCEDURE — 96375 TX/PRO/DX INJ NEW DRUG ADDON: CPT

## 2019-06-06 PROCEDURE — 85025 COMPLETE CBC W/AUTO DIFF WBC: CPT | Performed by: FAMILY MEDICINE

## 2019-06-06 PROCEDURE — 25010000002 LORAZEPAM PER 2 MG: Performed by: INTERNAL MEDICINE

## 2019-06-06 PROCEDURE — 93005 ELECTROCARDIOGRAM TRACING: CPT | Performed by: FAMILY MEDICINE

## 2019-06-06 PROCEDURE — 83735 ASSAY OF MAGNESIUM: CPT | Performed by: INTERNAL MEDICINE

## 2019-06-06 RX ORDER — LORAZEPAM 2 MG/ML
0.5 INJECTION INTRAMUSCULAR EVERY 6 HOURS PRN
Status: DISCONTINUED | OUTPATIENT
Start: 2019-06-06 | End: 2019-06-07 | Stop reason: HOSPADM

## 2019-06-06 RX ORDER — FAMOTIDINE 10 MG/ML
20 INJECTION, SOLUTION INTRAVENOUS EVERY 12 HOURS SCHEDULED
Status: DISCONTINUED | OUTPATIENT
Start: 2019-06-06 | End: 2019-06-07 | Stop reason: HOSPADM

## 2019-06-06 RX ORDER — SODIUM CHLORIDE 0.9 % (FLUSH) 0.9 %
10 SYRINGE (ML) INJECTION AS NEEDED
Status: DISCONTINUED | OUTPATIENT
Start: 2019-06-06 | End: 2019-06-07 | Stop reason: HOSPADM

## 2019-06-06 RX ORDER — SODIUM CHLORIDE 9 MG/ML
125 INJECTION, SOLUTION INTRAVENOUS CONTINUOUS
Status: DISCONTINUED | OUTPATIENT
Start: 2019-06-06 | End: 2019-06-07

## 2019-06-06 RX ORDER — LATANOPROST 50 UG/ML
1 SOLUTION/ DROPS OPHTHALMIC NIGHTLY
Status: DISCONTINUED | OUTPATIENT
Start: 2019-06-06 | End: 2019-06-07 | Stop reason: HOSPADM

## 2019-06-06 RX ORDER — HYDROMORPHONE HYDROCHLORIDE 1 MG/ML
0.5 INJECTION, SOLUTION INTRAMUSCULAR; INTRAVENOUS; SUBCUTANEOUS EVERY 6 HOURS PRN
Status: DISCONTINUED | OUTPATIENT
Start: 2019-06-06 | End: 2019-06-07

## 2019-06-06 RX ADMIN — SODIUM CHLORIDE 1000 ML: 9 INJECTION, SOLUTION INTRAVENOUS at 18:05

## 2019-06-06 RX ADMIN — SODIUM CHLORIDE 125 ML/HR: 9 INJECTION, SOLUTION INTRAVENOUS at 21:42

## 2019-06-06 RX ADMIN — LATANOPROST 1 DROP: 50 SOLUTION OPHTHALMIC at 21:53

## 2019-06-06 RX ADMIN — LORAZEPAM 0.5 MG: 2 INJECTION INTRAMUSCULAR; INTRAVENOUS at 21:42

## 2019-06-06 RX ADMIN — HYDROMORPHONE HYDROCHLORIDE 0.5 MG: 1 INJECTION, SOLUTION INTRAMUSCULAR; INTRAVENOUS; SUBCUTANEOUS at 21:42

## 2019-06-06 RX ADMIN — FAMOTIDINE 20 MG: 10 INJECTION, SOLUTION INTRAVENOUS at 18:37

## 2019-06-06 NOTE — TELEPHONE ENCOUNTER
Patient called stating she is so sick she is going to die if you don't help her states Dr. Ortiz has been treating her for Lichen Plantus and he thinks she may have yeast in her esophagus told her to call you she is having severe pain in her esophagus cant hardly swallow and has lost so much weight states something has to be done soon she is on Omeprazole.

## 2019-06-06 NOTE — TELEPHONE ENCOUNTER
Patient states she's already tried Nystatin but she does agree to go to [facility] she feels so bad told her ER doctor will evaluate her.

## 2019-06-06 NOTE — ED PROVIDER NOTES
Subjective   Patient is a 78-year-old female with a long history of complications associated with her esophagus.  She is had a diagnosis of lichen planus some 10 years ago with various complications associated with the disease itself and the immunosuppressive drugs used to treat that.  More recently she has had marketed weight loss difficulty swallowing food, has switched to a mostly soft diet for fear of choking.  She also has difficulty sleeping because of her symptoms that wake her up very early in the morning that are only relieved by viscous lidocaine.  She called her GI doctor today who advised her to come to the emergency department for evaluation and treatment.            Review of Systems   Constitutional: Positive for appetite change and unexpected weight change.   HENT: Positive for voice change.    Eyes: Negative.    Respiratory: Negative.    Cardiovascular: Negative.    Endocrine: Negative.    Genitourinary: Negative.    Neurological: Positive for weakness.   Psychiatric/Behavioral: Negative.        Past Medical History:   Diagnosis Date   • Arthralgia    • Fibromyalgia    • Lichen planus    • Mouth ulcers 11/18/2016   • Osteoporosis    • Primary osteoarthritis of both hands    • Sicca (CMS/HCC)    • Ulcerative colitis (CMS/HCC)    • Vitamin D deficiency    • Xerostomia 11/19/2016       Allergies   Allergen Reactions   • Latex        Past Surgical History:   Procedure Laterality Date   • BREAST EXCISIONAL BIOPSY Right     x 2 benign   • BREAST EXCISIONAL BIOPSY Left     x 1 benign   • BREAST LUMPECTOMY     • CHOLECYSTECTOMY     • COLONOSCOPY W/ BIOPSIES  05/22/2013    Changes consistent with collagenous colitis   • ENDOSCOPY  02/04/2015    Dilated normal exam   • HYSTERECTOMY         Family History   Problem Relation Age of Onset   • Cancer Mother    • Diabetes Paternal Grandmother    • Diabetes Paternal Grandfather    • Breast cancer Neg Hx    • Colon cancer Neg Hx    • Colon polyps Neg Hx         Social History     Socioeconomic History   • Marital status:      Spouse name: Not on file   • Number of children: Not on file   • Years of education: Not on file   • Highest education level: Not on file   Tobacco Use   • Smoking status: Former Smoker     Last attempt to quit:      Years since quittin.4   • Smokeless tobacco: Never Used   Substance and Sexual Activity   • Alcohol use: Yes     Alcohol/week: 2.4 oz     Types: 4 Glasses of wine per week   • Drug use: Defer           Objective   Physical Exam   Constitutional: She appears cachectic. She is cooperative.  Non-toxic appearance.   HENT:   Head: Normocephalic and atraumatic.   Mouth/Throat: Uvula is midline, oropharynx is clear and moist and mucous membranes are normal.   Eyes: Conjunctivae and lids are normal.   Neck: Trachea normal and full passive range of motion without pain.   Cardiovascular: Normal rate and regular rhythm.   Pulmonary/Chest: Effort normal and breath sounds normal.   Abdominal: Soft. Normal appearance.   Neurological: She is alert.       Procedures           ED Course                  MDM    Discussed with Dr. Acosta and Dr. Agee.  Dr. Agee kindly accepted the admission we will keep the patient n.p.o. tonight and Dr. Acosta will plan on endoscopy in the morning.  In the meantime we will rehydrate the patient  Final diagnoses:   Dehydration   Esophageal dysphagia            Olvin Cabello MD  19

## 2019-06-07 ENCOUNTER — ANESTHESIA (OUTPATIENT)
Dept: GASTROENTEROLOGY | Facility: HOSPITAL | Age: 79
End: 2019-06-07

## 2019-06-07 ENCOUNTER — APPOINTMENT (OUTPATIENT)
Dept: CT IMAGING | Facility: HOSPITAL | Age: 79
End: 2019-06-07

## 2019-06-07 ENCOUNTER — APPOINTMENT (OUTPATIENT)
Dept: GENERAL RADIOLOGY | Facility: HOSPITAL | Age: 79
End: 2019-06-07

## 2019-06-07 ENCOUNTER — ANESTHESIA EVENT (OUTPATIENT)
Dept: GASTROENTEROLOGY | Facility: HOSPITAL | Age: 79
End: 2019-06-07

## 2019-06-07 VITALS
WEIGHT: 93.4 LBS | TEMPERATURE: 98 F | RESPIRATION RATE: 16 BRPM | BODY MASS INDEX: 15.95 KG/M2 | OXYGEN SATURATION: 99 % | HEART RATE: 97 BPM | HEIGHT: 64 IN | SYSTOLIC BLOOD PRESSURE: 141 MMHG | DIASTOLIC BLOOD PRESSURE: 79 MMHG

## 2019-06-07 PROBLEM — R13.19 ESOPHAGEAL DYSPHAGIA: Status: ACTIVE | Noted: 2019-06-06

## 2019-06-07 PROBLEM — R13.10 DYSPHAGIA: Status: ACTIVE | Noted: 2019-06-07

## 2019-06-07 PROBLEM — R13.10 ODYNOPHAGIA: Status: ACTIVE | Noted: 2019-06-07

## 2019-06-07 LAB
ARTICHOKE IGE QN: 52 MG/DL (ref 0–99)
CHOLEST SERPL-MCNC: 129 MG/DL (ref 130–200)
FOLATE SERPL-MCNC: >20 NG/ML (ref 4.78–24.2)
GLUCOSE BLDC GLUCOMTR-MCNC: 69 MG/DL (ref 70–130)
GLUCOSE BLDC GLUCOMTR-MCNC: 86 MG/DL (ref 70–130)
HDLC SERPL-MCNC: 70 MG/DL
LDLC/HDLC SERPL: 0.68 {RATIO}
MAGNESIUM SERPL-MCNC: 2.2 MG/DL (ref 1.4–2.2)
PHOSPHATE SERPL-MCNC: 3.7 MG/DL (ref 2.5–4.5)
PREALB SERPL-MCNC: 23.5 MG/DL (ref 18–36)
TRIGL SERPL-MCNC: 56 MG/DL (ref 0–149)
TROPONIN I SERPL-MCNC: <0.012 NG/ML (ref 0–0.03)
TROPONIN I SERPL-MCNC: <0.012 NG/ML (ref 0–0.03)

## 2019-06-07 PROCEDURE — 80061 LIPID PANEL: CPT | Performed by: INTERNAL MEDICINE

## 2019-06-07 PROCEDURE — G0378 HOSPITAL OBSERVATION PER HR: HCPCS

## 2019-06-07 PROCEDURE — 25010000002 HYDROMORPHONE PER 4 MG: Performed by: INTERNAL MEDICINE

## 2019-06-07 PROCEDURE — 96376 TX/PRO/DX INJ SAME DRUG ADON: CPT

## 2019-06-07 PROCEDURE — 43239 EGD BIOPSY SINGLE/MULTIPLE: CPT | Performed by: INTERNAL MEDICINE

## 2019-06-07 PROCEDURE — 94799 UNLISTED PULMONARY SVC/PX: CPT

## 2019-06-07 PROCEDURE — 71045 X-RAY EXAM CHEST 1 VIEW: CPT

## 2019-06-07 PROCEDURE — 25010000002 IOPAMIDOL 61 % SOLUTION: Performed by: INTERNAL MEDICINE

## 2019-06-07 PROCEDURE — 84484 ASSAY OF TROPONIN QUANT: CPT | Performed by: INTERNAL MEDICINE

## 2019-06-07 PROCEDURE — 84134 ASSAY OF PREALBUMIN: CPT | Performed by: INTERNAL MEDICINE

## 2019-06-07 PROCEDURE — 82962 GLUCOSE BLOOD TEST: CPT

## 2019-06-07 PROCEDURE — 74178 CT ABD&PLV WO CNTR FLWD CNTR: CPT

## 2019-06-07 PROCEDURE — 25010000002 PROPOFOL 10 MG/ML EMULSION: Performed by: NURSE ANESTHETIST, CERTIFIED REGISTERED

## 2019-06-07 PROCEDURE — 96361 HYDRATE IV INFUSION ADD-ON: CPT

## 2019-06-07 PROCEDURE — 99214 OFFICE O/P EST MOD 30 MIN: CPT | Performed by: CLINICAL NURSE SPECIALIST

## 2019-06-07 PROCEDURE — 88305 TISSUE EXAM BY PATHOLOGIST: CPT | Performed by: INTERNAL MEDICINE

## 2019-06-07 PROCEDURE — 82746 ASSAY OF FOLIC ACID SERUM: CPT | Performed by: INTERNAL MEDICINE

## 2019-06-07 RX ORDER — LIDOCAINE HYDROCHLORIDE 20 MG/ML
INJECTION, SOLUTION INFILTRATION; PERINEURAL AS NEEDED
Status: DISCONTINUED | OUTPATIENT
Start: 2019-06-07 | End: 2019-06-07 | Stop reason: SURG

## 2019-06-07 RX ORDER — DAPSONE 25 MG/1
50 TABLET ORAL DAILY
COMMUNITY
End: 2019-06-14

## 2019-06-07 RX ORDER — PROPOFOL 10 MG/ML
VIAL (ML) INTRAVENOUS AS NEEDED
Status: DISCONTINUED | OUTPATIENT
Start: 2019-06-07 | End: 2019-06-07 | Stop reason: SURG

## 2019-06-07 RX ORDER — SODIUM CHLORIDE 9 MG/ML
100 INJECTION, SOLUTION INTRAVENOUS CONTINUOUS
Status: DISCONTINUED | OUTPATIENT
Start: 2019-06-07 | End: 2019-06-07 | Stop reason: HOSPADM

## 2019-06-07 RX ORDER — NICOTINE POLACRILEX 4 MG
15 LOZENGE BUCCAL
Status: DISCONTINUED | OUTPATIENT
Start: 2019-06-07 | End: 2019-06-07 | Stop reason: HOSPADM

## 2019-06-07 RX ORDER — SODIUM CHLORIDE 0.9 % (FLUSH) 0.9 %
3-10 SYRINGE (ML) INJECTION AS NEEDED
Status: DISCONTINUED | OUTPATIENT
Start: 2019-06-07 | End: 2019-06-07 | Stop reason: HOSPADM

## 2019-06-07 RX ORDER — HYDROXYCHLOROQUINE SULFATE 200 MG/1
200 TABLET, FILM COATED ORAL 2 TIMES DAILY
COMMUNITY
End: 2019-06-14

## 2019-06-07 RX ORDER — DEXTROSE MONOHYDRATE 25 G/50ML
25 INJECTION, SOLUTION INTRAVENOUS
Status: DISCONTINUED | OUTPATIENT
Start: 2019-06-07 | End: 2019-06-07 | Stop reason: HOSPADM

## 2019-06-07 RX ORDER — ONDANSETRON 2 MG/ML
4 INJECTION INTRAMUSCULAR; INTRAVENOUS EVERY 6 HOURS PRN
Status: DISCONTINUED | OUTPATIENT
Start: 2019-06-07 | End: 2019-06-07 | Stop reason: HOSPADM

## 2019-06-07 RX ORDER — ONDANSETRON 4 MG/1
4 TABLET, FILM COATED ORAL EVERY 6 HOURS PRN
Status: DISCONTINUED | OUTPATIENT
Start: 2019-06-07 | End: 2019-06-07 | Stop reason: HOSPADM

## 2019-06-07 RX ORDER — SODIUM CHLORIDE 0.9 % (FLUSH) 0.9 %
3 SYRINGE (ML) INJECTION EVERY 12 HOURS SCHEDULED
Status: DISCONTINUED | OUTPATIENT
Start: 2019-06-07 | End: 2019-06-07 | Stop reason: HOSPADM

## 2019-06-07 RX ORDER — AZATHIOPRINE 50 MG/1
50 TABLET ORAL DAILY
COMMUNITY
Start: 2019-05-29 | End: 2019-06-12

## 2019-06-07 RX ORDER — HYDROMORPHONE HYDROCHLORIDE 1 MG/ML
0.5 INJECTION, SOLUTION INTRAMUSCULAR; INTRAVENOUS; SUBCUTANEOUS EVERY 4 HOURS PRN
Status: DISCONTINUED | OUTPATIENT
Start: 2019-06-07 | End: 2019-06-07

## 2019-06-07 RX ADMIN — PROPOFOL 50 MG: 10 INJECTION, EMULSION INTRAVENOUS at 11:01

## 2019-06-07 RX ADMIN — PROPOFOL 50 MG: 10 INJECTION, EMULSION INTRAVENOUS at 10:57

## 2019-06-07 RX ADMIN — HYDROMORPHONE HYDROCHLORIDE 0.5 MG: 1 INJECTION, SOLUTION INTRAMUSCULAR; INTRAVENOUS; SUBCUTANEOUS at 01:50

## 2019-06-07 RX ADMIN — SODIUM CHLORIDE: 9 INJECTION, SOLUTION INTRAVENOUS at 10:55

## 2019-06-07 RX ADMIN — LIDOCAINE HYDROCHLORIDE 100 MG: 20 INJECTION, SOLUTION INFILTRATION; PERINEURAL at 11:01

## 2019-06-07 RX ADMIN — HYDROMORPHONE HYDROCHLORIDE 0.5 MG: 1 INJECTION, SOLUTION INTRAMUSCULAR; INTRAVENOUS; SUBCUTANEOUS at 06:56

## 2019-06-07 RX ADMIN — IOPAMIDOL 100 ML: 612 INJECTION, SOLUTION INTRAVENOUS at 15:00

## 2019-06-07 RX ADMIN — SODIUM CHLORIDE 125 ML/HR: 9 INJECTION, SOLUTION INTRAVENOUS at 05:08

## 2019-06-07 NOTE — ANESTHESIA POSTPROCEDURE EVALUATION
"Patient: Olive Bowman    Procedure Summary     Date:  06/07/19 Room / Location:  [facility] ENDOSCOPY 4 / [facility] ENDOSCOPY    Anesthesia Start:  1053 Anesthesia Stop:  1108    Procedure:  ESOPHAGOGASTRODUODENOSCOPY WITH ANESTHESIA (N/A ) Diagnosis:       Esophageal dysphagia      Odynophagia      (Esophageal dysphagia [R13.10])      (Odynophagia [R13.10])    Surgeon:  Kun Cavanaugh MD Provider:  Kumar Tam CRNA    Anesthesia Type:  MAC ASA Status:  2          Anesthesia Type: MAC  Last vitals  BP   149/60 (06/07/19 0807)   Temp   98 °F (36.7 °C) (06/07/19 0807)   Pulse   92 (06/07/19 0807)   Resp   16 (06/07/19 0807)     SpO2   94 % (06/07/19 0807)     Post Anesthesia Care and Evaluation    Patient location during evaluation: PHASE II  Patient participation: complete - patient participated  Level of consciousness: awake and alert  Pain management: adequate  Airway patency: patent  Anesthetic complications: No anesthetic complications    Cardiovascular status: acceptable  Respiratory status: acceptable  Hydration status: acceptable    Comments: Blood pressure 149/60, pulse 92, temperature 98 °F (36.7 °C), temperature source Oral, resp. rate 16, height 162.6 cm (64\"), weight 42.4 kg (93 lb 6.4 oz), SpO2 94 %.    Pt discharged from PACU based on florencio score >8      "

## 2019-06-07 NOTE — DISCHARGE SUMMARY
[facility] Medicine Services  DISCHARGE SUMMARY       Date of Admission: 6/6/2019  Date of Discharge:  6/7/2019  Primary Care Physician: Paulino Ortiz MD    Presenting Problem/History of Present Illness:  Pain with swallowing     Final Discharge Diagnoses:  Active Hospital Problems    Diagnosis   • Odynophagia   • Dehydration   • Esophageal dysphagia     Added automatically from request for surgery [id number]     • Dyspepsia     she is stable at this time. she is to take Gaviscon as needed and Omeprazole if persistent for more than a few weeks.      • Lichen planus   • Xerostomia     Consults:   1.  Dr. Kun Cavanaugh- gastroenterology    Procedures Performed:   1.  6/7/2019- Upper GI endoscopy    Pertinent Test Results:   Lab Results (all)     Procedure Component Value Units Date/Time    Tissue Pathology Exam [[id number]] Collected:  06/07/19 1100    Specimen:  Tissue from Esophagus Updated:  06/07/19 1341     Comment: : [id number] Saranya MyerslyMeter ID: [id number]       Folate [[id number]]  (Normal) Collected:  06/07/19 0414    Specimen:  Blood Updated:  06/07/19 0553     Folate >20.00 ng/mL     Troponin [[id number]]  (Normal) Collected:  06/07/19 0414    Specimen:  Blood Updated:  06/07/19 0456     Troponin I <0.012 ng/mL     Lipid Panel [[id number]]  (Abnormal) Collected:  06/07/19 0414    Specimen:  Blood Updated:  06/07/19 0456     Total Cholesterol 129 mg/dL      Triglycerides 56 mg/dL      HDL Cholesterol 70 mg/dL      LDL Cholesterol  52 mg/dL      LDL/HDL Ratio 0.68    Prealbumin [[id number]]  (Normal) Collected:  06/07/19 0414    Specimen:  Blood Updated:  06/07/19 0455     Prealbumin 23.5 mg/dL     Troponin [[id number]]  (Normal) Collected:  06/06/19 1805    Specimen:  Blood Updated:  06/07/19 0427     Troponin I <0.012 ng/mL     Magnesium [[id number]]  (Normal) Collected:  06/06/19 1805    Specimen:  Blood Updated:  06/07/19 0415     Magnesium 2.2 mg/dL      Phosphorus [[id number]]  (Normal) Collected:  06/06/19 1805    Specimen:  Blood Updated:  06/07/19 0415     Phosphorus 3.7 mg/dL     Comprehensive Metabolic Panel [[id number]]  (Abnormal) Collected:  06/06/19 1805    Specimen:  Blood Updated:  06/06/19 1827     Glucose 66 mg/dL      BUN 36 mg/dL      Creatinine 1.09 mg/dL      Sodium 135 mmol/L      Potassium 3.7 mmol/L      Chloride 94 mmol/L      CO2 33.0 mmol/L      Calcium 9.5 mg/dL      Total Protein 7.2 g/dL      Albumin 4.20 g/dL      ALT (SGPT) 37 U/L      AST (SGOT) 39 U/L      Alkaline Phosphatase 94 U/L      Total Bilirubin 0.3 mg/dL      eGFR Non African Amer 49 mL/min/1.73      Globulin 3.0 gm/dL      A/G Ratio 1.4 g/dL      BUN/Creatinine Ratio 33.0    Lipase [[id number]]  (Normal) Collected:  06/06/19 1805    Specimen:  Blood Updated:  06/06/19 1827     Lipase 64 U/L     Protime-INR [[id number]]  (Normal) Collected:  06/06/19 1805    Specimen:  Blood Updated:  06/06/19 1824     Protime 12.5 Seconds      INR 0.91    aPTT [[id number]]  (Normal) Collected:  06/06/19 1805    Specimen:  Blood Updated:  06/06/19 1824     PTT 27.2 seconds     CBC Auto Differential [[id number]]  (Abnormal) Collected:  06/06/19 1805    Specimen:  Blood Updated:  06/06/19 1815     WBC 14.77 10*3/mm3      RBC 3.97 10*6/mm3      Hemoglobin 13.1 g/dL      Hematocrit 40.6 %      .3 fL      MCH 33.0 pg      MCHC 32.3 g/dL      RDW 12.4 %      RDW-SD 47.0 fl      MPV 8.8 fL      Platelets 281 10*3/mm3      Neutrophil % 84.4 %      Lymphocyte % 6.9 %      Monocyte % 7.1 %      Eosinophil % 0.1 %      Basophil % 0.3 %      Immature Grans % 1.2 %      Neutrophils, Absolute 12.48 10*3/mm3      Lymphocytes, Absolute 1.02 10*3/mm3      Monocytes, Absolute 1.05 10*3/mm3      Eosinophils, Absolute 0.01 10*3/mm3      Basophils, Absolute 0.04 10*3/mm3      Immature Grans, Absolute 0.17 10*3/mm3      nRBC 0.0 /100 WBC         Imaging Results (all)     Procedure Component Value Units Date/Time     CT Abdomen Pelvis With & Without Contrast [[id number]] Collected:  06/07/19 1432     Updated:  06/07/19 1444    Narrative:       EXAMINATION:  CT ABDOMEN PELVIS WITHOUT AND WITH CONTRAST-  6/7/2019  1:47 PM CDT     HISTORY: Weight loss. Mid epigastric pain. E86.0-Dehydration;  R13.10-Dysphagia, unspecified; R13.10-Dysphagia, unspecified.     TECHNIQUE: Spiral CT was performed of the abdomen and pelvis without and  with contrast. Multiplanar images were reconstructed.     DLP: 374 mGy-cm. Automated dosage control was utilized.     COMPARISON: No comparison study.      LUNG BASES: Lung bases are clear. There is calcified left lower lobe  granuloma.     LIVER AND SPLEEN: There are multiple liver cysts. There has been prior  cholecystectomy. There is prominence of the extrahepatic biliary tree  which is likely related to prior cholecystectomy. The spleen is  unremarkable.     PANCREAS: No pancreatic mass or inflammatory change.     KIDNEYS, ADRENALS AND URINARY BLADDER: The kidneys and adrenal glands  are unremarkable. No bladder abnormality is detected.     BOWEL: There is diffuse wall thickening throughout the stomach that is  probably an artifact of underdistention. No oral contrast was given.  Small bowel loops are nondilated. There is small to moderate stool in  the colon. There is diverticulosis of the colon that is most severe in  the sigmoid region.     OTHER: There is atheromatous disease of the aortoiliac vessels. There  has been prior hysterectomy. There are degenerative changes of the  visualized spine. No acute appearing bony abnormality is seen.       Impression:       1. Multiple liver cysts.  2. Prior cholecystectomy and hysterectomy.  3. Diffuse wall thickening throughout the stomach is likely an artifact  of underdistention. No oral contrast was given. Small bowel loops are  nondilated. Diverticulosis of the colon most severe in the sigmoid  region.  4. Atheromatous disease of the aortoiliac  vessels.  This report was finalized on 06/07/2019 14:41 by Dr. Emiliano Stephens MD.    XR Chest 1 View [[id number]] Collected:  06/07/19 0707     Updated:  06/07/19 0711    Narrative:       EXAMINATION:  XR CHEST 1 VW-  6/7/2019 5:35 AM CDT     HISTORY: Chest pain.     COMPARISON: 03/15/2010.     FINDINGS:  There is hyperexpansion versus deep inspiration. There is a  calcified granuloma at the left lung base. There is biapical pleural  thickening. There is no dense infiltrate or effusion. The heart is  normal in size.       Impression:       1. Hyperexpansion versus deep inspiration. Old granulomatous disease.  Biapical scarring.  2. No acute appearing infiltrate or effusion.        This report was finalized on 06/07/2019 07:08 by Dr. Emiliano Stephens MD.        Chief Complaint on Day of Discharge: YEFRI pain    History of Present Illness on Day of Discharge: Patient is resting in bed with multiple family members at bedside.  She reports that she feels better and would like to go home today.    Hospital Course:  Ms. Bowman is a 78-year-old  female with a past medical history significant for dyspepsia, ulcerative colitis, Xerostomia, and lichen planus.  She presents to the [facility] emergency department on 6/6/2019 with complaints of difficulty with swallowing.  She has lost 8 pounds in the last 2 weeks.  She also had complaints of midepigastric pain.  In the emergency department, her blood glucose is noted to be 66, BUN 36, and white blood cell count 14,000.  Chest x-ray showed hyperexpansion versus deep inspiration with no acute appearing infiltrate or effusion.  The patient was admitted to the hospitalist service for further evaluation and management.    The patient was seen in consultation by gastroenterology as history of esophageal dilation.  She was taken for upper GI endoscopy today, which showed intrinsic stenosis throughout the entire esophagus.  This was biopsied but not dilated.  Evidence  "of ulceration or inflammation.  Biopsy was taken.  She has been instructed to eat soft foods only.  She is to continue acid suppression with Prilosec.  We did perform CT of the abdomen and pelvis due to complaints of mid epigastric pain.  Lipase was normal on admission.  CT showed multiple liver cysts and diffuse wall thickening of the stomach, likely an artifact of under distention.    Overall, the patient is hemodynamically stable and appropriate for discharge home today.  She will follow with her primary care provider in 1 week.  Dr. Cavanaugh is to make arrangements for her to see a specialist at [facility] regarding her severe esophageal stenosis.    Condition on Discharge: Stable    Physical Exam on Discharge:  /79   Pulse 97   Temp 98 °F (36.7 °C) (Oral)   Resp 16   Ht 162.6 cm (64\")   Wt 42.4 kg (93 lb 6.4 oz)   SpO2 99%   BMI 16.03 kg/m²   Physical Exam   Constitutional: She is oriented to person, place, and time. She appears well-developed and well-nourished. No distress.   Thin, frail   HENT:   Head: Normocephalic and atraumatic.   Neck: Normal range of motion. Neck supple. No JVD present. No tracheal deviation present.   Cardiovascular: Normal rate, regular rhythm, normal heart sounds and intact distal pulses. Exam reveals no gallop and no friction rub.   Pulmonary/Chest: Effort normal and breath sounds normal. She has no wheezes. She has no rales.   Abdominal: Soft. Bowel sounds are normal. She exhibits no distension. There is tenderness (YEFRI). There is no guarding.   Musculoskeletal: Normal range of motion. She exhibits no edema or tenderness.   Neurological: She is alert and oriented to person, place, and time. No cranial nerve deficit.   Skin: Skin is warm and dry. No rash noted. No erythema.   Psychiatric: She has a normal mood and affect. Her behavior is normal. Judgment and thought content normal.   Vitals reviewed.    Discharge Disposition:  Home or self-care    Discharge " Medications:     Discharge Medications      Continue These Medications      Instructions Start Date   ALPRAZolam 1 MG tablet  Commonly known as:  XANAX   1 mg, Oral, 2 Times Daily PRN      aluminum hydroxide-magnesium carbonate  MG/15ML suspension oral suspension  Commonly known as:  GAVISCON   15 mL, Oral, 3 Times Daily With Meals      estradiol 1 MG tablet  Commonly known as:  ESTRACE   1 mg, Oral, Daily      HYDROcodone-acetaminophen 7.5-325 MG per tablet  Commonly known as:  NORCO   1 tablet, Oral, Every 6 Hours PRN      latanoprost 0.005 % ophthalmic solution  Commonly known as:  XALATAN   1 drop, Nightly      lidocaine-Maalox-diphenhydramine 900 mL suspension  Commonly known as:  MIRACLE MOUTHWASH   30 mL, Oral, 4 Times Daily PRN      nystatin 625639 UNIT/ML suspension  Commonly known as:  MYCOSTATIN   500,000 Units, Swish & Swallow, 4 Times Daily      omeprazole 20 MG capsule  Commonly known as:  priLOSEC   20 mg, Oral, Daily      orphenadrine 100 MG 12 hr tablet  Commonly known as:  NORFLEX   100 mg, Oral, 2 Times Daily      pilocarpine 5 MG tablet  Commonly known as:  SALAGEN   5 mg, Oral, 3 Times Daily      PROBIOTIC PO   Oral      vitamin B-12 1000 MCG tablet  Commonly known as:  CYANOCOBALAMIN   1,000 mcg, Oral, Daily      vitamin C 500 MG tablet  Commonly known as:  ASCORBIC ACID   1,000 mg, Oral, Daily      vitamin D 86532 units capsule capsule  Commonly known as:  ERGOCALCIFEROL   50,000 Units, Oral, Every 30 Days      vitamin E 1000 UNIT capsule   1,000 Units, Oral, Daily           Discharge Diet:   Diet Instructions     Diet: Dysphagia; Thin Liquids, No Restrictions; Mechanical Soft      Discharge Diet:  Dysphagia    Fluid Consistency:  Thin Liquids, No Restrictions    Pureed Options:  Mechanical Soft        Activity at Discharge:   Activity Instructions     Activity as Tolerated          Discharge Care Plan/Instructions:   1.  Return for any acute or worsening symptoms  2.  Mechanical soft  diet    Follow-up Appointments:   1.  Primary care provider in 1 week  2.  Dr. Cavanaugh to make referral to a specialist at [facility] regarding her severe esophageal stenosis    Test Results Pending at Discharge: Surgical biopsy from upper GI endoscopy.    PERI Harrison  06/07/19  3:30 PM    Time: 25 minutes.    I personally evaluated and examined the patient in conjunction with PERI Barrientos and agree with the assessment, treatment plan, and disposition of the patient as recorded by her. My history, exam, and further recommendations are:     Patient seen and examined at bedside.  Patient to see specialist at [facility] via Dr. Cavanaugh for her severe esophageal stenosis.  Recommended soft foots for passage through significant narrowing.  CT abdomen and pelvis showed liver cysts and otherwise ok.    Arnel Dey MD  06/07/19  7:43 PM

## 2019-06-07 NOTE — PROGRESS NOTES
Discharge Planning Assessment  [facility]     Patient Name: Olive Bowman  MRN: [id number]  Today's Date: 6/7/2019    Admit Date: 6/6/2019    Discharge Needs Assessment     Row Name 06/07/19 1117       Living Environment    Lives With  spouse    Name(s) of Who Lives With Patient  Aime     Current Living Arrangements  home/apartment/condo    Primary Care Provided by  self    Provides Primary Care For  no one    Family Caregiver if Needed  spouse;child(shasha), adult    Quality of Family Relationships  helpful;involved;supportive    Able to Return to Prior Arrangements  yes       Resource/Environmental Concerns    Resource/Environmental Concerns  none    Transportation Concerns  car, none       Transition Planning    Patient/Family Anticipates Transition to  home with family    Patient/Family Anticipated Services at Transition      Transportation Anticipated  family or friend will provide       Discharge Needs Assessment    Readmission Within the Last 30 Days  no previous admission in last 30 days    Concerns to be Addressed  denies needs/concerns at this time;no discharge needs identified    Equipment Currently Used at Home  bipap/cpap;oxygen;grab bar    Equipment Needed After Discharge  none    Discharge Coordination/Progress  SW spoke with Aime, pt's , with assessment questions. Pt plans on returning home at discharge with . Pt is independent and denies any needs for HH at this time. SW will follow and assist with any discharge needs that may arise.         Discharge Plan    No documentation.       Destination      No service coordination in this encounter.      Durable Medical Equipment      No service coordination in this encounter.      Dialysis/Infusion      No service coordination in this encounter.      Home Medical Care      No service coordination in this encounter.      Therapy      No service coordination in this encounter.      Community Resources      No service  coordination in this encounter.          Demographic Summary    No documentation.       Functional Status    No documentation.       Psychosocial    No documentation.       Abuse/Neglect    No documentation.       Legal    No documentation.       Substance Abuse    No documentation.       Patient Forms    No documentation.           Alma Delia Lizama

## 2019-06-07 NOTE — CONSULTS
[facility] GASTROENTEROLOGY              Initial Inpatient Consult Note  Olive Bowman  1940    Referring Provider: Frank Agee MD    Admission: 6/6/2019  Consult date: 6/7/2019  Chief complaint: dysphagia    Subjective     History of present illness:  Pt is a 78-year-old female admitted with difficulty swallowing progressive ongoing for 2 months.  She called the office and said that she was having difficulty with any oral intake it is painful and does not want to go down.  This is located to the distal lower esophageal region.  She especially has difficulties with breads and meats.  It is severe.  She tells me she has suffered with recent oral yeast she remains on steroids off and on for her lichen planus.  She was treated with Diflucan.  She says her esophageal issues did not resolve that she at times does have very painful swallowing as well.  She has lost approximately 7 pounds since we saw her last, September 2018.  No melena.  No bright red blood per rectum she denies any abdominal pain.  No fever chills or sweats.  Endoscopy 2/4/2015 by Dr. Kun Cavanaugh normal examined duodenum normal esophagus dilated.    Past Medical History:  Past Medical History:   Diagnosis Date   • Arthralgia    • Dyspepsia 9/5/2018    she is stable at this time. she is to take Gaviscon as needed and Omeprazole if persistent for more than a few weeks.    • Fibromyalgia    • Lichen planus    • Mouth ulcers 11/18/2016   • Osteoporosis    • Primary osteoarthritis of both hands    • Sicca (CMS/HCC)    • Ulcerative colitis (CMS/HCC)    • Vitamin D deficiency    • Xerostomia 11/19/2016       Past Surgical History:  Past Surgical History:   Procedure Laterality Date   • BREAST EXCISIONAL BIOPSY Right     x 2 benign   • BREAST EXCISIONAL BIOPSY Left     x 1 benign   • BREAST LUMPECTOMY     • CHOLECYSTECTOMY     • COLONOSCOPY W/ BIOPSIES  05/22/2013    Changes consistent with collagenous colitis   • ENDOSCOPY  02/04/2015     Dilated normal exam   • HYSTERECTOMY         Social History:   Social History     Tobacco Use   • Smoking status: Former Smoker     Last attempt to quit:      Years since quittin.4   • Smokeless tobacco: Never Used   Substance Use Topics   • Alcohol use: Yes     Alcohol/week: 2.4 oz     Types: 4 Glasses of wine per week        Family History:  Family History   Problem Relation Age of Onset   • Cancer Mother    • Diabetes Paternal Grandmother    • Diabetes Paternal Grandfather    • Breast cancer Neg Hx    • Colon cancer Neg Hx    • Colon polyps Neg Hx        Home Meds:  Medications Prior to Admission   Medication Sig Dispense Refill Last Dose   • ALPRAZolam (XANAX) 0.5 MG tablet Take 0.5 mg by mouth 2 (Two) Times a Day As Needed for anxiety.   Taking   • aluminum-magnesium hydroxide-simethicone (MAALOX MAX) 400-400-40 MG/5ML suspension Take  by mouth Every 6 (Six) Hours As Needed for Indigestion or Heartburn.   Taking   • estradiol (ESTRACE) 1 MG tablet Take 1 mg by mouth Daily.   Taking   • HYDROcodone-acetaminophen (NORCO) 7.5-325 MG per tablet Take 1 tablet by mouth Every 6 (Six) Hours As Needed for moderate pain (4-6).   Taking   • latanoprost (XALATAN) 0.005 % ophthalmic solution 1 drop Every Night.   Taking   • lidocaine-Maalox-diphenhydramine (MIRACLE MOUTHWASH) 900 mL suspension Take 30 mL by mouth 4 (Four) Times a Day As Needed.   Taking   • nystatin (MYCOSTATIN) 379286 UNIT/ML suspension Swish and swallow 5 mL 4 (Four) Times a Day for 10 days. 60 mL 1    • omeprazole (priLOSEC) 20 MG capsule Take 1 capsule by mouth Daily. 30 capsule 11    • orphenadrine (NORFLEX) 100 MG 12 hr tablet Take 100 mg by mouth 2 (Two) Times a Day.   Taking   • pilocarpine (SALAGEN) 5 MG tablet Take 5 mg by mouth 3 (Three) Times a Day.   Taking   • Probiotic Product (PROBIOTIC PO) Take  by mouth.   Taking   • vitamin B-12 (CYANOCOBALAMIN) 1000 MCG tablet Take 1,000 mcg by mouth Daily.   Taking   • vitamin C (ASCORBIC  ACID) 500 MG tablet Take 1,000 mg by mouth Daily.   Taking   • vitamin D (ERGOCALCIFEROL) 33306 UNITS capsule capsule Take 50,000 Units by mouth Every 30 (Thirty) Days.   Taking   • vitamin E 1000 UNIT capsule Take 1,000 Units by mouth Daily.   Taking       Current Meds:   Hospital Medications (active)       Dose Frequency Start End    dextrose (D50W) 25 g/ 50mL Intravenous Solution 25 g 25 g Every 15 Minutes PRN 6/7/2019     Sig - Route: Infuse 50 mL into a venous catheter Every 15 (Fifteen) Minutes As Needed for Low Blood Sugar (Blood Sugar Less Than 70, Patient Has IV Access - Unresponsive, NPO or Unable To Safely Swallow). - Intravenous    dextrose (GLUTOSE) oral gel 15 g 15 g Every 15 Minutes PRN 6/7/2019     Sig - Route: Take 15 application by mouth Every 15 (Fifteen) Minutes As Needed for Low Blood Sugar (Blood Sugar Less Than 70, Patient Alert, Is Not NPO & Can Safely Swallow). - Oral    famotidine (PEPCID) injection 20 mg 20 mg Every 12 Hours Scheduled 6/6/2019     Sig - Route: Infuse 2 mL into a venous catheter Every 12 (Twelve) Hours. - Intravenous    glucagon (human recombinant) (GLUCAGEN DIAGNOSTIC) injection 1 mg 1 mg Every 15 Minutes PRN 6/7/2019     Sig - Route: Inject 1 mg under the skin into the appropriate area as directed Every 15 (Fifteen) Minutes As Needed for Low Blood Sugar (Blood Glucose Less Than 70 - Patient Without IV Access - Unresponsive, NPO or Unable To Safely Swallow). - Subcutaneous    HYDROmorphone (DILAUDID) injection 0.5 mg 0.5 mg Every 4 Hours PRN 6/7/2019     Sig - Route: Infuse 0.5 mL into a venous catheter Every 4 (Four) Hours As Needed for Moderate Pain  or Severe Pain . - Intravenous    latanoprost (XALATAN) 0.005 % ophthalmic solution 1 drop 1 drop Nightly 6/6/2019     Sig - Route: Administer 1 drop to both eyes Every Night. - Both Eyes    LORazepam (ATIVAN) injection 0.5 mg 0.5 mg Every 6 Hours PRN 6/6/2019 6/16/2019    Sig - Route: Infuse 0.25 mL into a venous catheter  "Every 6 (Six) Hours As Needed for Anxiety. - Intravenous    ondansetron (ZOFRAN) injection 4 mg 4 mg Every 6 Hours PRN 6/7/2019     Sig - Route: Infuse 2 mL into a venous catheter Every 6 (Six) Hours As Needed for Nausea or Vomiting. - Intravenous    Linked Group 1:  \"Or\" Linked Group Details        ondansetron (ZOFRAN) tablet 4 mg 4 mg Every 6 Hours PRN 6/7/2019     Sig - Route: Take 1 tablet by mouth Every 6 (Six) Hours As Needed for Nausea or Vomiting. - Oral    Linked Group 1:  \"Or\" Linked Group Details        sodium chloride 0.9 % bolus 1,000 mL 1,000 mL Once 6/6/2019 6/6/2019    Sig - Route: Infuse 1,000 mL into a venous catheter 1 (One) Time. - Intravenous    sodium chloride 0.9 % flush 10 mL 10 mL As Needed 6/6/2019     Sig - Route: Infuse 10 mL into a venous catheter As Needed for Line Care. - Intravenous    Linked Group 2:  \"And\" Linked Group Details        sodium chloride 0.9 % flush 3 mL 3 mL Every 12 Hours Scheduled 6/7/2019     Sig - Route: Infuse 3 mL into a venous catheter Every 12 (Twelve) Hours. - Intravenous    sodium chloride 0.9 % flush 3-10 mL 3-10 mL As Needed 6/7/2019     Sig - Route: Infuse 3-10 mL into a venous catheter As Needed for Line Care. - Intravenous    sodium chloride 0.9 % infusion 125 mL/hr Continuous 6/6/2019     Sig - Route: Infuse 125 mL/hr into a venous catheter Continuous. - Intravenous    HYDROmorphone (DILAUDID) injection 0.5 mg (Discontinued) 0.5 mg Every 6 Hours PRN 6/6/2019 6/7/2019    Sig - Route: Infuse 0.5 mL into a venous catheter Every 6 (Six) Hours As Needed for Moderate Pain  or Severe Pain . - Intravenous          Allergies:  Allergies   Allergen Reactions   • Latex        Review of Systems  Review of Systems   Constitutional: Positive for fatigue and unexpected weight change. Negative for activity change, appetite change, chills, diaphoresis and fever.   HENT: Positive for trouble swallowing. Negative for ear pain, hearing loss, mouth sores, sore throat and " voice change.    Eyes: Negative.    Respiratory: Negative for cough, choking, shortness of breath and wheezing.    Cardiovascular: Negative for chest pain and palpitations.   Gastrointestinal: Negative for abdominal pain, blood in stool, constipation, diarrhea, nausea and vomiting.   Endocrine: Negative for cold intolerance and heat intolerance.   Genitourinary: Negative for decreased urine volume, dysuria, frequency, hematuria and urgency.   Musculoskeletal: Negative for back pain, gait problem and myalgias.   Skin: Negative for color change, pallor and rash.   Allergic/Immunologic: Negative for food allergies and immunocompromised state.   Neurological: Positive for weakness. Negative for dizziness, tremors, seizures, syncope, light-headedness, numbness and headaches.   Hematological: Negative for adenopathy. Does not bruise/bleed easily.   Psychiatric/Behavioral: Negative for agitation and confusion. The patient is not nervous/anxious.    All other systems reviewed and are negative.       Objective     Vital Signs  Temp:  [97.5 °F (36.4 °C)-98.4 °F (36.9 °C)] 98 °F (36.7 °C)  Heart Rate:  [] 92  Resp:  [16] 16  BP: (121-152)/(56-77) 149/60  Body mass index is 16.03 kg/m².    Physical Exam:  General Appearance:    Alert, cooperative, in no acute distress   Head:    Normocephalic, without obvious abnormality, atraumatic   Eyes:            Lids and lashes normal, conjunctivae and sclerae normal, no   Icterus, conjunctival pallor   Throat:   No oral lesions, no thrush, oral mucosa moist, posterior oropharynx clear   Neck:   No adenopathy, supple, trachea midline, no thyromegaly, no   carotid bruit, no JVD   Lungs:     Clear to auscultation,respirations regular, even and                   unlabored    Heart:    Regular rhythm and normal rate, normal S1 and S2, no            murmur   Chest Wall:    No abnormalities observed   Abdomen:     Normal bowel sounds, no masses, no organomegaly, soft        non-tender,  non-distended, no guarding, no rebound                 tenderness   Rectal:     Deferred   Extremities:   no edema, no cyanosis   Skin:   No open lesions, bruising or rash   Lymph nodes:   No palpable cervical adenopathy   Psychiatric:  Judgement and insight: normal   Orientation to person place and time: normal   Mood and affect: normal     Results Review:  Results from last 7 days   Lab Units 06/06/19  1805   WBC 10*3/mm3 14.77*   HEMOGLOBIN g/dL 13.1   HEMATOCRIT % 40.6   PLATELETS 10*3/mm3 281       Results from last 7 days   Lab Units 06/06/19  1805   SODIUM mmol/L 135   POTASSIUM mmol/L 3.7   CHLORIDE mmol/L 94*   CO2 mmol/L 33.0*   BUN mg/dL 36*   CREATININE mg/dL 1.09   CALCIUM mg/dL 9.5   BILIRUBIN mg/dL 0.3   ALK PHOS U/L 94   ALT (SGPT) U/L 37   AST (SGOT) U/L 39   GLUCOSE mg/dL 66*       Results from last 7 days   Lab Units 06/06/19  1805   INR  0.91        Radiology Review:  Imaging Results (last 72 hours)     Procedure Component Value Units Date/Time    XR Chest 1 View [[id number]] Collected:  06/07/19 0707     Updated:  06/07/19 0711    Narrative:       EXAMINATION:  XR CHEST 1 VW-  6/7/2019 5:35 AM CDT     HISTORY: Chest pain.     COMPARISON: 03/15/2010.     FINDINGS:  There is hyperexpansion versus deep inspiration. There is a  calcified granuloma at the left lung base. There is biapical pleural  thickening. There is no dense infiltrate or effusion. The heart is  normal in size.       Impression:       1. Hyperexpansion versus deep inspiration. Old granulomatous disease.  Biapical scarring.  2. No acute appearing infiltrate or effusion.        This report was finalized on 06/07/2019 07:08 by Dr. Emiliano Stephens MD.          Assessment/Plan         Xerostomia    Lichen planus    Dyspepsia    Dehydration    Odynophagia      1.  Dysphasia/odynophagia  2.  Recent oral Candida  3.  Weight loss    We will plan upper endoscopy today by Dr. Kun Cavanaugh.  Risk benefits indications alternatives have been  discussed and she is agreeable.  Further recommendations to follow endoscopy evaluation    EMR Dragon/transcription disclaimer: Much of this encounter note is electronic transcription/translation of spoken language to printed text. The electronic translation of spoken language may be erroneous, or at times, nonsensical words or phrases may be inadvertently transcribed. Although I have reviewed the note for such errors, some may still exist.  Neela Hurst [facility]astro[facility]  06/07/19  8:46 AM      EMR Dragon/transcription disclaimer: Much of this encounter note is an electronic transcription/translation of spoken language to printed text.  The electronic translation of spoken language may permit erroneous, or at times, nonsensical words or phrases to be inadvertently transcribed.  Although I have reviewed the note for such errors, some may still exist.      Kun Cavanaugh MD  11:06 AM  6/7/2019

## 2019-06-07 NOTE — ANESTHESIA PREPROCEDURE EVALUATION
Anesthesia Evaluation     Patient summary reviewed   no history of anesthetic complications:  NPO Solid Status: > 8 hours  NPO Liquid Status: > 8 hours           Airway   Mallampati: II  TM distance: >3 FB  Neck ROM: full  Dental - normal exam     Pulmonary - normal exam    breath sounds clear to auscultation  (+) sleep apnea on CPAP,   (-) asthma, recent URI, not a smoker  Cardiovascular - normal exam  Exercise tolerance: good (4-7 METS)    ECG reviewed  Rhythm: regular  Rate: normal    (-) pacemaker, hypertension, past MI, angina, cardiac stents, CABG      Neuro/Psych  (-) seizures, TIA, CVA  GI/Hepatic/Renal/Endo    (+)  GERD,    (-) liver disease, no renal disease, diabetes, hypothyroidism, hyperthyroidism    ROS Comment: Dysphagia    Musculoskeletal         ROS comment: Fibromyalgia  Abdominal    Substance History      OB/GYN          Other        ROS/Med Hx Other: Reports having an autoimmune disorder, but is unsure of the exact nature of the disorder, consists of xerostomia, mouth ulcers, lichen planus                  Anesthesia Plan    ASA 2     MAC     intravenous induction   Anesthetic plan, all risks, benefits, and alternatives have been provided, discussed and informed consent has been obtained with: patient.

## 2019-06-07 NOTE — H&P
Full  Daughter karla        [facility] Medicine Services  HISTORY AND PHYSICAL    Date of Admission: 6/6/2019  Primary Care Physician: Paulino Ortiz MD    Subjective     Chief Complaint: Pain with swallowing    History of Present Illness  Patient is a 78-year-old white female past medical history of lichen planus who suffers significant problems with esophageal problems and mouth ulcers from the disease.  She has had to have her esophagus dilated at least 2 other times.  She now has gotten to the point where she is having to eat soft food and can barely tolerate that.  She is drinking viscous lidocaine to help with the pain.  She has lost 8 pounds in the last 2 weeks and has become scared for her health.  She denies fevers or chills at this time.  She was evaluated in the emergency room after contacting her GI doctor who told her to come here.  We have been asked to admit her for observation so the GI can see her.  She is complaining of severe midsternal pain especially with swallowing.        Review of Systems   14 point review of systems negative except for as per HPI    Otherwise complete ROS reviewed and negative except as mentioned in the HPI.    Past Medical History:   Past Medical History:   Diagnosis Date   • Arthralgia    • Dyspepsia 9/5/2018    she is stable at this time. she is to take Gaviscon as needed and Omeprazole if persistent for more than a few weeks.    • Fibromyalgia    • Lichen planus    • Mouth ulcers 11/18/2016   • Osteoporosis    • Primary osteoarthritis of both hands    • Sicca (CMS/HCC)    • Ulcerative colitis (CMS/HCC)    • Vitamin D deficiency    • Xerostomia 11/19/2016     Past Surgical History:  Past Surgical History:   Procedure Laterality Date   • BREAST EXCISIONAL BIOPSY Right     x 2 benign   • BREAST EXCISIONAL BIOPSY Left     x 1 benign   • BREAST LUMPECTOMY     • CHOLECYSTECTOMY     • COLONOSCOPY W/ BIOPSIES  05/22/2013     Changes consistent with collagenous colitis   • ENDOSCOPY  02/04/2015    Dilated normal exam   • HYSTERECTOMY       Social History:  reports that she quit smoking about 40 years ago. She has never used smokeless tobacco. She reports that she drinks about 2.4 oz of alcohol per week. Drug use questions deferred to the physician.    Family History: family history includes Cancer in her mother; Diabetes in her paternal grandfather and paternal grandmother.       Allergies:  Allergies   Allergen Reactions   • Latex      Medications:  Prior to Admission medications    Medication Sig Start Date End Date Taking? Authorizing Provider   ALPRAZolam (XANAX) 0.5 MG tablet Take 0.5 mg by mouth 2 (Two) Times a Day As Needed for anxiety.    Lester Bell MD   aluminum-magnesium hydroxide-simethicone (MAALOX MAX) 400-400-40 MG/5ML suspension Take  by mouth Every 6 (Six) Hours As Needed for Indigestion or Heartburn.    Lester Bell MD   estradiol (ESTRACE) 1 MG tablet Take 1 mg by mouth Daily.    Lester Bell MD   HYDROcodone-acetaminophen (NORCO) 7.5-325 MG per tablet Take 1 tablet by mouth Every 6 (Six) Hours As Needed for moderate pain (4-6).    Lester Bell MD   latanoprost (XALATAN) 0.005 % ophthalmic solution 1 drop Every Night.    Lester Bell MD   lidocaine-Maalox-diphenhydramine (MIRACLE MOUTHWASH) 900 mL suspension Take 30 mL by mouth 4 (Four) Times a Day As Needed.    Lester Bell MD   nystatin (MYCOSTATIN) 846173 UNIT/ML suspension Swish and swallow 5 mL 4 (Four) Times a Day for 10 days. 6/6/19 6/16/19  Kun Cavanaugh MD   omeprazole (priLOSEC) 20 MG capsule Take 1 capsule by mouth Daily. 9/5/18   Neela Hurst APRN   orphenadrine (NORFLEX) 100 MG 12 hr tablet Take 100 mg by mouth 2 (Two) Times a Day.    Lester Bell MD   pilocarpine (SALAGEN) 5 MG tablet Take 5 mg by mouth 3 (Three) Times a Day.    Lester Bell MD   Probiotic Product  "(PROBIOTIC PO) Take  by mouth.    Lester Bell MD   vitamin B-12 (CYANOCOBALAMIN) 1000 MCG tablet Take 1,000 mcg by mouth Daily.    Lester Bell MD   vitamin C (ASCORBIC ACID) 500 MG tablet Take 1,000 mg by mouth Daily.    Lester Bell MD   vitamin D (ERGOCALCIFEROL) 31480 UNITS capsule capsule Take 50,000 Units by mouth Every 30 (Thirty) Days.    Lester Bell MD   vitamin E 1000 UNIT capsule Take 1,000 Units by mouth Daily.    Lester Bell MD     Objective     Vital Signs: /74 (BP Location: Right arm, Patient Position: Lying)   Pulse 75   Temp 98.1 °F (36.7 °C) (Oral)   Resp 16   Ht 162.6 cm (64\")   Wt 42.4 kg (93 lb 6.4 oz)   SpO2 100%   BMI 16.03 kg/m²   Physical Exam  Gen.: Thin white female in no acute distress  HEENT: Atraumatic, normocephalic.  Pupils equal, round, and reactive to light. Extraocular movements intact.  Sclerae anicteric.  TMs negative.  Mucous membranes dry no oral lesions are noted no thrush.  Neck is supple without lymphadenopathy.  No JVD is noted.  No carotid bruits are auscultated.  Oropharynx is without erythema or exudate.   Chest: Clear to auscultation and percussion.  CV: Regular rate and rhythm.  Normal S1-S2.  No gallops, murmurs. or rubs.  Abdomen: Soft, nontender, nondistended.  Active bowel sounds,  No hepatosplenomegaly.  No masses.  No hernias.  Extremities: No clubbing, edema, or cyanosis.  Capillary refill is normal.  Pulses are 2+ and symmetric at radial and dorsalis pedis.  Posterior tibial pulses are intact and 2+ palpable.  Neuro: Patient is awake, alert, and oriented ×3.  Cranial nerves II through XII are grossly intact.  Motor is 5 out of 5 bilaterally.  DTRs are 2+ and symmetric bilaterally. Sensory exam is nonfocal  Skin: Warm, dry, and intact.  No evidence of breakdown.  Sensorium: Normal thought and affect    Nursing notes and vital signs reviewed        Results Reviewed:  Lab Results (last 24 hours)     " Procedure Component Value Units Date/Time    Comprehensive Metabolic Panel [[id number]]  (Abnormal) Collected:  06/06/19 1805    Specimen:  Blood Updated:  06/06/19 1827     Glucose 66 mg/dL      BUN 36 mg/dL      Creatinine 1.09 mg/dL      Sodium 135 mmol/L      Potassium 3.7 mmol/L      Chloride 94 mmol/L      CO2 33.0 mmol/L      Calcium 9.5 mg/dL      Total Protein 7.2 g/dL      Albumin 4.20 g/dL      ALT (SGPT) 37 U/L      AST (SGOT) 39 U/L      Alkaline Phosphatase 94 U/L      Total Bilirubin 0.3 mg/dL      eGFR Non African Amer 49 mL/min/1.73      Globulin 3.0 gm/dL      A/G Ratio 1.4 g/dL      BUN/Creatinine Ratio 33.0     Anion Gap 8.0 mmol/L     Narrative:       GFR Normal >60  Chronic Kidney Disease <60  Kidney Failure <15    Lipase [[id number]]  (Normal) Collected:  06/06/19 1805    Specimen:  Blood Updated:  06/06/19 1827     Lipase 64 U/L     Protime-INR [[id number]]  (Normal) Collected:  06/06/19 1805    Specimen:  Blood Updated:  06/06/19 1824     Protime 12.5 Seconds      INR 0.91    aPTT [[id number]]  (Normal) Collected:  06/06/19 1805    Specimen:  Blood Updated:  06/06/19 1824     PTT 27.2 seconds     CBC & Differential [[id number]] Collected:  06/06/19 1805    Specimen:  Blood Updated:  06/06/19 1815    Narrative:       The following orders were created for panel order CBC & Differential.  Procedure                               Abnormality         Status                     ---------                               -----------         ------                     CBC Auto Differential[[id number]]        Abnormal            Final result                 Please view results for these tests on the individual orders.    CBC Auto Differential [[id number]]  (Abnormal) Collected:  06/06/19 1805    Specimen:  Blood Updated:  06/06/19 1815     WBC 14.77 10*3/mm3      RBC 3.97 10*6/mm3      Hemoglobin 13.1 g/dL      Hematocrit 40.6 %      .3 fL      MCH 33.0 pg      MCHC 32.3 g/dL      RDW 12.4 %      RDW-SD  47.0 fl      MPV 8.8 fL      Platelets 281 10*3/mm3      Neutrophil % 84.4 %      Lymphocyte % 6.9 %      Monocyte % 7.1 %      Eosinophil % 0.1 %      Basophil % 0.3 %      Immature Grans % 1.2 %      Neutrophils, Absolute 12.48 10*3/mm3      Lymphocytes, Absolute 1.02 10*3/mm3      Monocytes, Absolute 1.05 10*3/mm3      Eosinophils, Absolute 0.01 10*3/mm3      Basophils, Absolute 0.04 10*3/mm3      Immature Grans, Absolute 0.17 10*3/mm3      nRBC 0.0 /100 WBC         Imaging Results (last 24 hours)     ** No results found for the last 24 hours. **        I have personally reviewed and interpreted the radiology studies and ECG obtained at time of admission.     Assessment / Plan     Assessment:   Active Hospital Problems    Diagnosis   • Odynophagia   • Dehydration   • Dyspepsia     she is stable at this time. she is to take Gaviscon as needed and Omeprazole if persistent for more than a few weeks.      • Lichen planus   • Xerostomia   1.  Chest pain probably due to complications from lichen planus and esophageal stricture plan is to admit the patient n.p.o. status IV Pepcid is ordered and consult GI for further plans and possible EGD with dilatation if appropriate.  We will also get chest x-ray her EKG is unremarkable however I am going to get some cardiac markers just to make sure were not missing something else.  2.  Dehydration IV fluids for hydration monitor labs.  3.  Hypoglycemia probably due to poor p.o. intake will obtain Accu-Cheks short-term.  4.  Lichen planus without current active disease defer therapy plans to outpatient physician.  5.  Anxiety she normally takes Xanax she is n.p.o. she will ability to get we will give her IV Ativan as needed.  6.  Malnutrition we will check pre-albumin as well as B12 and folate and get dietitian to see her.  Patient seen after midnight:          Code Status: Full     I discussed the patient's findings and my recommendations with the patient and her daughter Caitlyn  who is her healthcare power surrogate should she not be able to speak for herself.    Estimated length of stay 1 to 2 days    Frank Agee MD   06/07/19   3:49 AM

## 2019-06-08 NOTE — PROGRESS NOTES
Malnutrition Severity Assessment    Patient Name:  Olive Bowman  YOB: 1940  MRN: [id number]  Admit Date:  6/6/2019    Patient meets criteria for : Severe Malnutrition    Comments:  If in agreement with malnutrition assessment, please attest documentation. Thanks.     Malnutrition Severity Assessment  Malnutrition Type: Chronic Disease - Related Malnutrition     Malnutrition Type (last 8 hours)      Malnutrition Severity Assessment     Row Name 06/08/19 0727       Malnutrition Severity Assessment    Malnutrition Type  Chronic Disease - Related Malnutrition    Row Name 06/08/19 0727       Unintentional Weight Loss     Unintentional Weight Loss   Weight loss of 5% in one month 6+% (6-8 lbs) over the last 2 wks per Pt report    Row Name 06/08/19 0727       Muscle Loss    Loss of Muscle Mass Findings  Severe    [place] Region  Moderate - slight depression    Clavicle Bone Region  Severe - protruding prominent bone    Acromion Bone Region  Severe - squared shoulders, bones, and acromion process protrusion prominent    Anterior Thigh Region  Moderate - mild depression on inner thigh    Posterior Calf Region  Severe - thin with very little definition/firmness    Row Name 06/08/19 0727       Fat Loss    Subcutaneous Fat Loss Findings  Moderate    Orbital Region   Moderate -  somewhat hollowness, slightly dark circles    Upper Arm Region  Moderate - some fat tissue, not ample    Thoracic & Lumbar Region  Moderate - ribs visible with mild depressions, iliac crest somewhat prominent    Row Name 06/08/19 0727       Criteria Met (Must meet criteria for severity in at least 2 of these categories: M Wasting, Fat Loss, Fluid, Secondary Signs, Wt. Status, Intake)    Patient meets criteria for   Severe Malnutrition          Electronically signed by:  Shweta Potter RDN, LD  06/08/19 7:32 AM

## 2019-06-10 ENCOUNTER — TELEPHONE (OUTPATIENT)
Dept: GASTROENTEROLOGY | Facility: CLINIC | Age: 79
End: 2019-06-10

## 2019-06-10 LAB
CYTO UR: NORMAL
LAB AP CASE REPORT: NORMAL
LAB AP CLINICAL INFORMATION: NORMAL
PATH REPORT.FINAL DX SPEC: NORMAL
PATH REPORT.GROSS SPEC: NORMAL

## 2019-06-10 NOTE — TELEPHONE ENCOUNTER
----- Message from Kun Cavanaugh MD sent at 6/7/2019 12:00 PM CDT -----  Needs appt with dr cottrell at [facility]  Dysphagia   Hx lichen planus  Sooner than later  bx pending  Also needs out pt esophagram

## 2019-06-14 ENCOUNTER — HOSPITAL ENCOUNTER (INPATIENT)
Facility: HOSPITAL | Age: 79
LOS: 7 days | Discharge: HOME HEALTH | End: 2019-06-24
Attending: INTERNAL MEDICINE | Admitting: SPECIALIST

## 2019-06-14 ENCOUNTER — APPOINTMENT (OUTPATIENT)
Dept: GENERAL RADIOLOGY | Facility: HOSPITAL | Age: 79
End: 2019-06-14

## 2019-06-14 ENCOUNTER — TELEPHONE (OUTPATIENT)
Dept: GASTROENTEROLOGY | Facility: CLINIC | Age: 79
End: 2019-06-14

## 2019-06-14 DIAGNOSIS — K22.2 ESOPHAGEAL STRICTURE: ICD-10-CM

## 2019-06-14 DIAGNOSIS — Z87.2: ICD-10-CM

## 2019-06-14 DIAGNOSIS — E86.9 VOLUME DEPLETION: ICD-10-CM

## 2019-06-14 DIAGNOSIS — Z93.1 G TUBE FEEDINGS (HCC): ICD-10-CM

## 2019-06-14 DIAGNOSIS — Z78.9 DECREASED ACTIVITIES OF DAILY LIVING (ADL): ICD-10-CM

## 2019-06-14 DIAGNOSIS — K22.2 ESOPHAGEAL STENOSIS: ICD-10-CM

## 2019-06-14 DIAGNOSIS — S41.112A SKIN TEAR OF LEFT UPPER ARM WITHOUT COMPLICATION, INITIAL ENCOUNTER: ICD-10-CM

## 2019-06-14 DIAGNOSIS — R13.19 ESOPHAGEAL DYSPHAGIA: ICD-10-CM

## 2019-06-14 DIAGNOSIS — R11.2 NON-INTRACTABLE VOMITING WITH NAUSEA, UNSPECIFIED VOMITING TYPE: Primary | ICD-10-CM

## 2019-06-14 DIAGNOSIS — S51.812S SKIN TEAR OF FOREARM WITHOUT COMPLICATION, LEFT, SEQUELA: ICD-10-CM

## 2019-06-14 PROBLEM — E43 SEVERE MALNUTRITION: Status: ACTIVE | Noted: 2019-06-14

## 2019-06-14 LAB
ALBUMIN SERPL-MCNC: 3.1 G/DL (ref 3.5–5)
ALBUMIN/GLOB SERPL: 1.1 G/DL (ref 1.1–2.5)
ALP SERPL-CCNC: 77 U/L (ref 24–120)
ALT SERPL W P-5'-P-CCNC: 62 U/L (ref 0–54)
AMYLASE SERPL-CCNC: 39 U/L (ref 30–110)
ANION GAP SERPL CALCULATED.3IONS-SCNC: 7 MMOL/L (ref 4–13)
AST SERPL-CCNC: 68 U/L (ref 7–45)
BILIRUB SERPL-MCNC: 0.3 MG/DL (ref 0.1–1)
BILIRUB UR QL STRIP: NEGATIVE
BUN BLD-MCNC: 27 MG/DL (ref 5–21)
BUN/CREAT SERPL: 30 (ref 7–25)
CALCIUM SPEC-SCNC: 8.8 MG/DL (ref 8.4–10.4)
CHLORIDE SERPL-SCNC: 91 MMOL/L (ref 98–110)
CLARITY UR: ABNORMAL
CO2 SERPL-SCNC: 32 MMOL/L (ref 24–31)
COLOR UR: ABNORMAL
CREAT BLD-MCNC: 0.9 MG/DL (ref 0.5–1.4)
D-LACTATE SERPL-SCNC: 2.7 MMOL/L (ref 0.5–2)
D-LACTATE SERPL-SCNC: 3.6 MMOL/L (ref 0.5–2)
DEPRECATED RDW RBC AUTO: 44.9 FL (ref 40–54)
EOSINOPHIL # BLD MANUAL: 0.06 10*3/MM3 (ref 0–0.7)
EOSINOPHIL NFR BLD MANUAL: 1 % (ref 0–4)
ERYTHROCYTE [DISTWIDTH] IN BLOOD BY AUTOMATED COUNT: 12.3 % (ref 12–15)
GFR SERPL CREATININE-BSD FRML MDRD: 61 ML/MIN/1.73
GLOBULIN UR ELPH-MCNC: 2.7 GM/DL
GLUCOSE BLD-MCNC: 144 MG/DL (ref 70–100)
GLUCOSE UR STRIP-MCNC: NEGATIVE MG/DL
HCT VFR BLD AUTO: 31.9 % (ref 37–47)
HGB BLD-MCNC: 10.5 G/DL (ref 12–16)
HGB UR QL STRIP.AUTO: NEGATIVE
HOLD SPECIMEN: NORMAL
KETONES UR QL STRIP: ABNORMAL
LEUKOCYTE ESTERASE UR QL STRIP.AUTO: NEGATIVE
LIPASE SERPL-CCNC: 18 U/L (ref 23–203)
LYMPHOCYTES # BLD MANUAL: 0.23 10*3/MM3 (ref 0.72–4.86)
LYMPHOCYTES NFR BLD MANUAL: 2 % (ref 4–12)
LYMPHOCYTES NFR BLD MANUAL: 4 % (ref 15–45)
MCH RBC QN AUTO: 32.5 PG (ref 28–32)
MCHC RBC AUTO-ENTMCNC: 32.9 G/DL (ref 33–36)
MCV RBC AUTO: 98.8 FL (ref 82–98)
MONOCYTES # BLD AUTO: 0.12 10*3/MM3 (ref 0.19–1.3)
NEUTROPHILS # BLD AUTO: 5.35 10*3/MM3 (ref 1.87–8.4)
NEUTROPHILS NFR BLD MANUAL: 53 % (ref 39–78)
NEUTS BAND NFR BLD MANUAL: 40 % (ref 0–10)
NITRITE UR QL STRIP: NEGATIVE
PH UR STRIP.AUTO: 5.5 [PH] (ref 5–8)
PLAT MORPH BLD: NORMAL
PLATELET # BLD AUTO: 187 10*3/MM3 (ref 130–400)
PMV BLD AUTO: 9.5 FL (ref 6–12)
POTASSIUM BLD-SCNC: 3.7 MMOL/L (ref 3.5–5.3)
PROCALCITONIN SERPL-MCNC: 0.91 NG/ML
PROT SERPL-MCNC: 5.8 G/DL (ref 6.3–8.7)
PROT UR QL STRIP: NEGATIVE
RBC # BLD AUTO: 3.23 10*6/MM3 (ref 4.2–5.4)
RBC MORPH BLD: NORMAL
SODIUM BLD-SCNC: 130 MMOL/L (ref 135–145)
SP GR UR STRIP: 1.02 (ref 1–1.03)
UROBILINOGEN UR QL STRIP: ABNORMAL
WBC MORPH BLD: NORMAL
WBC NRBC COR # BLD: 5.75 10*3/MM3 (ref 4.8–10.8)
WHOLE BLOOD HOLD SPECIMEN: NORMAL
WHOLE BLOOD HOLD SPECIMEN: NORMAL

## 2019-06-14 PROCEDURE — 85007 BL SMEAR W/DIFF WBC COUNT: CPT | Performed by: NURSE PRACTITIONER

## 2019-06-14 PROCEDURE — 83605 ASSAY OF LACTIC ACID: CPT | Performed by: NURSE PRACTITIONER

## 2019-06-14 PROCEDURE — 36415 COLL VENOUS BLD VENIPUNCTURE: CPT | Performed by: NURSE PRACTITIONER

## 2019-06-14 PROCEDURE — 94799 UNLISTED PULMONARY SVC/PX: CPT

## 2019-06-14 PROCEDURE — 82150 ASSAY OF AMYLASE: CPT | Performed by: NURSE PRACTITIONER

## 2019-06-14 PROCEDURE — 25010000002 FLUCONAZOLE PER 200 MG: Performed by: NURSE PRACTITIONER

## 2019-06-14 PROCEDURE — 87040 BLOOD CULTURE FOR BACTERIA: CPT | Performed by: NURSE PRACTITIONER

## 2019-06-14 PROCEDURE — 25010000002 ONDANSETRON PER 1 MG: Performed by: NURSE PRACTITIONER

## 2019-06-14 PROCEDURE — 83690 ASSAY OF LIPASE: CPT | Performed by: NURSE PRACTITIONER

## 2019-06-14 PROCEDURE — 80053 COMPREHEN METABOLIC PANEL: CPT | Performed by: NURSE PRACTITIONER

## 2019-06-14 PROCEDURE — 25010000002 HYDROMORPHONE PER 4 MG: Performed by: INTERNAL MEDICINE

## 2019-06-14 PROCEDURE — 85025 COMPLETE CBC W/AUTO DIFF WBC: CPT | Performed by: NURSE PRACTITIONER

## 2019-06-14 PROCEDURE — 99284 EMERGENCY DEPT VISIT MOD MDM: CPT

## 2019-06-14 PROCEDURE — 84145 PROCALCITONIN (PCT): CPT | Performed by: INTERNAL MEDICINE

## 2019-06-14 PROCEDURE — 81003 URINALYSIS AUTO W/O SCOPE: CPT | Performed by: NURSE PRACTITIONER

## 2019-06-14 PROCEDURE — G0378 HOSPITAL OBSERVATION PER HR: HCPCS

## 2019-06-14 PROCEDURE — 74022 RADEX COMPL AQT ABD SERIES: CPT

## 2019-06-14 RX ORDER — SODIUM CHLORIDE 0.9 % (FLUSH) 0.9 %
3 SYRINGE (ML) INJECTION EVERY 12 HOURS SCHEDULED
Status: DISCONTINUED | OUTPATIENT
Start: 2019-06-14 | End: 2019-06-24 | Stop reason: HOSPADM

## 2019-06-14 RX ORDER — FLUCONAZOLE 2 MG/ML
200 INJECTION, SOLUTION INTRAVENOUS ONCE
Status: COMPLETED | OUTPATIENT
Start: 2019-06-14 | End: 2019-06-14

## 2019-06-14 RX ORDER — ONDANSETRON 4 MG/1
4 TABLET, FILM COATED ORAL EVERY 6 HOURS PRN
Status: DISCONTINUED | OUTPATIENT
Start: 2019-06-14 | End: 2019-06-24 | Stop reason: HOSPADM

## 2019-06-14 RX ORDER — ALPRAZOLAM 0.5 MG/1
1 TABLET ORAL 2 TIMES DAILY PRN
Status: DISCONTINUED | OUTPATIENT
Start: 2019-06-14 | End: 2019-06-22

## 2019-06-14 RX ORDER — ACETAMINOPHEN 160 MG/5ML
650 SOLUTION ORAL ONCE
Status: COMPLETED | OUTPATIENT
Start: 2019-06-14 | End: 2019-06-14

## 2019-06-14 RX ORDER — ACETAMINOPHEN 160 MG/5ML
650 SOLUTION ORAL EVERY 6 HOURS PRN
Status: DISCONTINUED | OUTPATIENT
Start: 2019-06-14 | End: 2019-06-24 | Stop reason: HOSPADM

## 2019-06-14 RX ORDER — AZATHIOPRINE 50 MG/1
100 TABLET ORAL DAILY
Status: DISCONTINUED | OUTPATIENT
Start: 2019-06-14 | End: 2019-06-16

## 2019-06-14 RX ORDER — ONDANSETRON 2 MG/ML
4 INJECTION INTRAMUSCULAR; INTRAVENOUS EVERY 6 HOURS PRN
Status: DISCONTINUED | OUTPATIENT
Start: 2019-06-14 | End: 2019-06-24 | Stop reason: HOSPADM

## 2019-06-14 RX ORDER — CLOTRIMAZOLE 10 MG/1
10 LOZENGE ORAL; TOPICAL
Status: DISCONTINUED | OUTPATIENT
Start: 2019-06-14 | End: 2019-06-24 | Stop reason: HOSPADM

## 2019-06-14 RX ORDER — CHOLECALCIFEROL (VITAMIN D3) 125 MCG
1000 CAPSULE ORAL DAILY
Status: DISCONTINUED | OUTPATIENT
Start: 2019-06-14 | End: 2019-06-22

## 2019-06-14 RX ORDER — ALUMINA, MAGNESIA, AND SIMETHICONE 2400; 2400; 240 MG/30ML; MG/30ML; MG/30ML
15 SUSPENSION ORAL 3 TIMES DAILY PRN
Status: DISCONTINUED | OUTPATIENT
Start: 2019-06-14 | End: 2019-06-24 | Stop reason: HOSPADM

## 2019-06-14 RX ORDER — SODIUM CHLORIDE 0.9 % (FLUSH) 0.9 %
3-10 SYRINGE (ML) INJECTION AS NEEDED
Status: DISCONTINUED | OUTPATIENT
Start: 2019-06-14 | End: 2019-06-24 | Stop reason: HOSPADM

## 2019-06-14 RX ORDER — ORPHENADRINE CITRATE 30 MG/ML
60 INJECTION INTRAMUSCULAR; INTRAVENOUS EVERY 12 HOURS PRN
Status: DISCONTINUED | OUTPATIENT
Start: 2019-06-14 | End: 2019-06-24 | Stop reason: HOSPADM

## 2019-06-14 RX ORDER — FAMOTIDINE 10 MG/ML
20 INJECTION, SOLUTION INTRAVENOUS ONCE
Status: COMPLETED | OUTPATIENT
Start: 2019-06-14 | End: 2019-06-14

## 2019-06-14 RX ORDER — LANSOPRAZOLE
30 KIT EVERY MORNING
Status: DISCONTINUED | OUTPATIENT
Start: 2019-06-15 | End: 2019-06-24 | Stop reason: HOSPADM

## 2019-06-14 RX ORDER — ONDANSETRON 2 MG/ML
4 INJECTION INTRAMUSCULAR; INTRAVENOUS ONCE
Status: COMPLETED | OUTPATIENT
Start: 2019-06-14 | End: 2019-06-14

## 2019-06-14 RX ORDER — SODIUM CHLORIDE 9 MG/ML
75 INJECTION, SOLUTION INTRAVENOUS CONTINUOUS
Status: DISCONTINUED | OUTPATIENT
Start: 2019-06-14 | End: 2019-06-18

## 2019-06-14 RX ORDER — LATANOPROST 50 UG/ML
1 SOLUTION/ DROPS OPHTHALMIC NIGHTLY
Status: DISCONTINUED | OUTPATIENT
Start: 2019-06-14 | End: 2019-06-24 | Stop reason: HOSPADM

## 2019-06-14 RX ORDER — AZATHIOPRINE 50 MG/1
100 TABLET ORAL DAILY
COMMUNITY
End: 2019-06-24 | Stop reason: HOSPADM

## 2019-06-14 RX ORDER — HYDROMORPHONE HYDROCHLORIDE 1 MG/ML
0.5 INJECTION, SOLUTION INTRAMUSCULAR; INTRAVENOUS; SUBCUTANEOUS ONCE
Status: COMPLETED | OUTPATIENT
Start: 2019-06-14 | End: 2019-06-14

## 2019-06-14 RX ORDER — LORAZEPAM 2 MG/ML
0.25 INJECTION INTRAMUSCULAR ONCE
Status: DISCONTINUED | OUTPATIENT
Start: 2019-06-14 | End: 2019-06-15

## 2019-06-14 RX ORDER — ESTRADIOL 1 MG/1
1 TABLET ORAL DAILY
Status: DISCONTINUED | OUTPATIENT
Start: 2019-06-14 | End: 2019-06-22

## 2019-06-14 RX ADMIN — FLUCONAZOLE 200 MG: 200 INJECTION, SOLUTION INTRAVENOUS at 18:12

## 2019-06-14 RX ADMIN — SODIUM CHLORIDE, PRESERVATIVE FREE 3 ML: 5 INJECTION INTRAVENOUS at 20:47

## 2019-06-14 RX ADMIN — DIPHENHYDRAMINE HYDROCHLORIDE 30 ML: 12.5 SOLUTION ORAL at 18:12

## 2019-06-14 RX ADMIN — DIPHENHYDRAMINE HYDROCHLORIDE 30 ML: 12.5 SOLUTION ORAL at 20:48

## 2019-06-14 RX ADMIN — SODIUM CHLORIDE 1000 ML: 9 INJECTION, SOLUTION INTRAVENOUS at 14:21

## 2019-06-14 RX ADMIN — SODIUM CHLORIDE 1000 ML: 9 INJECTION, SOLUTION INTRAVENOUS at 12:19

## 2019-06-14 RX ADMIN — CLOTRIMAZOLE 10 MG: 10 LOZENGE ORAL at 18:12

## 2019-06-14 RX ADMIN — CLOTRIMAZOLE 10 MG: 10 LOZENGE ORAL at 20:47

## 2019-06-14 RX ADMIN — ONDANSETRON 4 MG: 2 SOLUTION INTRAMUSCULAR; INTRAVENOUS at 12:19

## 2019-06-14 RX ADMIN — HYDROMORPHONE HYDROCHLORIDE 0.5 MG: 1 INJECTION, SOLUTION INTRAMUSCULAR; INTRAVENOUS; SUBCUTANEOUS at 15:19

## 2019-06-14 RX ADMIN — SODIUM CHLORIDE 100 ML/HR: 9 INJECTION, SOLUTION INTRAVENOUS at 17:57

## 2019-06-14 RX ADMIN — ACETAMINOPHEN 650 MG: 160 SOLUTION ORAL at 15:20

## 2019-06-14 RX ADMIN — LATANOPROST 1 DROP: 50 SOLUTION OPHTHALMIC at 20:48

## 2019-06-14 RX ADMIN — FAMOTIDINE 20 MG: 10 INJECTION, SOLUTION INTRAVENOUS at 14:14

## 2019-06-14 NOTE — TELEPHONE ENCOUNTER
Patients daughter called stating her mom got out of the hospital last week and she states her mom is doing worse she is vomiting and very weak sores all in her mouth told her to take her mom back to [facility] ER for evaluation and she agreed to do so.

## 2019-06-15 ENCOUNTER — APPOINTMENT (OUTPATIENT)
Dept: GENERAL RADIOLOGY | Facility: HOSPITAL | Age: 79
End: 2019-06-15

## 2019-06-15 PROBLEM — R50.9 FEVER: Status: ACTIVE | Noted: 2019-06-15

## 2019-06-15 LAB
ALBUMIN SERPL-MCNC: 2.7 G/DL (ref 3.5–5)
ALBUMIN/GLOB SERPL: 1.1 G/DL (ref 1.1–2.5)
ALP SERPL-CCNC: 110 U/L (ref 24–120)
ALT SERPL W P-5'-P-CCNC: 124 U/L (ref 0–54)
ANION GAP SERPL CALCULATED.3IONS-SCNC: 4 MMOL/L (ref 4–13)
ANION GAP SERPL CALCULATED.3IONS-SCNC: 6 MMOL/L (ref 4–13)
AST SERPL-CCNC: 112 U/L (ref 7–45)
BILIRUB SERPL-MCNC: 0.3 MG/DL (ref 0.1–1)
BUN BLD-MCNC: 16 MG/DL (ref 5–21)
BUN BLD-MCNC: 17 MG/DL (ref 5–21)
BUN/CREAT SERPL: 21.8 (ref 7–25)
BUN/CREAT SERPL: 21.9 (ref 7–25)
CALCIUM SPEC-SCNC: 7.6 MG/DL (ref 8.4–10.4)
CALCIUM SPEC-SCNC: 8.3 MG/DL (ref 8.4–10.4)
CHLORIDE SERPL-SCNC: 102 MMOL/L (ref 98–110)
CHLORIDE SERPL-SCNC: 103 MMOL/L (ref 98–110)
CO2 SERPL-SCNC: 28 MMOL/L (ref 24–31)
CO2 SERPL-SCNC: 30 MMOL/L (ref 24–31)
CREAT BLD-MCNC: 0.73 MG/DL (ref 0.5–1.4)
CREAT BLD-MCNC: 0.78 MG/DL (ref 0.5–1.4)
DEPRECATED RDW RBC AUTO: 46.8 FL (ref 40–54)
ERYTHROCYTE [DISTWIDTH] IN BLOOD BY AUTOMATED COUNT: 12.6 % (ref 12–15)
GFR SERPL CREATININE-BSD FRML MDRD: 72 ML/MIN/1.73
GFR SERPL CREATININE-BSD FRML MDRD: 78 ML/MIN/1.73
GLOBULIN UR ELPH-MCNC: 2.5 GM/DL
GLUCOSE BLD-MCNC: 113 MG/DL (ref 70–100)
GLUCOSE BLD-MCNC: 96 MG/DL (ref 70–100)
GLUCOSE BLDC GLUCOMTR-MCNC: 95 MG/DL (ref 70–130)
HCT VFR BLD AUTO: 31.2 % (ref 37–47)
HGB BLD-MCNC: 10.1 G/DL (ref 12–16)
MAGNESIUM SERPL-MCNC: 1.5 MG/DL (ref 1.4–2.2)
MCH RBC QN AUTO: 32.7 PG (ref 28–32)
MCHC RBC AUTO-ENTMCNC: 32.4 G/DL (ref 33–36)
MCV RBC AUTO: 101 FL (ref 82–98)
PHOSPHATE SERPL-MCNC: 1.8 MG/DL (ref 2.5–4.5)
PLATELET # BLD AUTO: 155 10*3/MM3 (ref 130–400)
PMV BLD AUTO: 9.8 FL (ref 6–12)
POTASSIUM BLD-SCNC: 3.9 MMOL/L (ref 3.5–5.3)
POTASSIUM BLD-SCNC: 4 MMOL/L (ref 3.5–5.3)
PROT SERPL-MCNC: 5.2 G/DL (ref 6.3–8.7)
RBC # BLD AUTO: 3.09 10*6/MM3 (ref 4.2–5.4)
SODIUM BLD-SCNC: 136 MMOL/L (ref 135–145)
SODIUM BLD-SCNC: 137 MMOL/L (ref 135–145)
WBC NRBC COR # BLD: 5.52 10*3/MM3 (ref 4.8–10.8)

## 2019-06-15 PROCEDURE — 82962 GLUCOSE BLOOD TEST: CPT

## 2019-06-15 PROCEDURE — 71046 X-RAY EXAM CHEST 2 VIEWS: CPT

## 2019-06-15 PROCEDURE — 84100 ASSAY OF PHOSPHORUS: CPT | Performed by: INTERNAL MEDICINE

## 2019-06-15 PROCEDURE — 25010000002 CEFTRIAXONE PER 250 MG: Performed by: INTERNAL MEDICINE

## 2019-06-15 PROCEDURE — 85027 COMPLETE CBC AUTOMATED: CPT | Performed by: NURSE PRACTITIONER

## 2019-06-15 PROCEDURE — 87040 BLOOD CULTURE FOR BACTERIA: CPT | Performed by: INTERNAL MEDICINE

## 2019-06-15 PROCEDURE — 83735 ASSAY OF MAGNESIUM: CPT | Performed by: INTERNAL MEDICINE

## 2019-06-15 PROCEDURE — 94799 UNLISTED PULMONARY SVC/PX: CPT

## 2019-06-15 PROCEDURE — G0378 HOSPITAL OBSERVATION PER HR: HCPCS

## 2019-06-15 PROCEDURE — 63710000001 AZATHIOPRINE PER 50 MG: Performed by: NURSE PRACTITIONER

## 2019-06-15 PROCEDURE — 80053 COMPREHEN METABOLIC PANEL: CPT | Performed by: NURSE PRACTITIONER

## 2019-06-15 RX ADMIN — ACETAMINOPHEN 650 MG: 650 SOLUTION ORAL at 18:57

## 2019-06-15 RX ADMIN — ESTRADIOL 1 MG: 1 TABLET ORAL at 09:32

## 2019-06-15 RX ADMIN — LIDOCAINE HYDROCHLORIDE 10 ML: 20 SOLUTION ORAL; TOPICAL at 04:08

## 2019-06-15 RX ADMIN — Medication 1000 MCG: at 09:31

## 2019-06-15 RX ADMIN — HYDROCODONE BITARTRATE AND ACETAMINOPHEN 15 ML: 7.5; 325 SOLUTION ORAL at 17:43

## 2019-06-15 RX ADMIN — LATANOPROST 1 DROP: 50 SOLUTION OPHTHALMIC at 20:34

## 2019-06-15 RX ADMIN — SODIUM CHLORIDE 75 ML/HR: 9 INJECTION, SOLUTION INTRAVENOUS at 22:08

## 2019-06-15 RX ADMIN — AZATHIOPRINE 100 MG: 50 TABLET ORAL at 09:32

## 2019-06-15 RX ADMIN — CLOTRIMAZOLE 10 MG: 10 LOZENGE ORAL at 06:54

## 2019-06-15 RX ADMIN — HYDROCODONE BITARTRATE AND ACETAMINOPHEN 15 ML: 7.5; 325 SOLUTION ORAL at 09:31

## 2019-06-15 RX ADMIN — ALPRAZOLAM 1 MG: 0.5 TABLET ORAL at 01:30

## 2019-06-15 RX ADMIN — HYDROCODONE BITARTRATE AND ACETAMINOPHEN 15 ML: 7.5; 325 SOLUTION ORAL at 01:30

## 2019-06-15 RX ADMIN — SODIUM CHLORIDE 100 ML/HR: 9 INJECTION, SOLUTION INTRAVENOUS at 04:57

## 2019-06-15 RX ADMIN — CLOTRIMAZOLE 10 MG: 10 LOZENGE ORAL at 20:33

## 2019-06-15 RX ADMIN — ACETAMINOPHEN 649.6 MG: 650 SOLUTION ORAL at 04:52

## 2019-06-15 RX ADMIN — LIDOCAINE HYDROCHLORIDE 10 ML: 20 SOLUTION ORAL; TOPICAL at 13:07

## 2019-06-15 RX ADMIN — CLOTRIMAZOLE 10 MG: 10 LOZENGE ORAL at 17:42

## 2019-06-15 RX ADMIN — DIPHENHYDRAMINE HYDROCHLORIDE 30 ML: 12.5 SOLUTION ORAL at 12:22

## 2019-06-15 RX ADMIN — CEFTRIAXONE SODIUM 1 G: 1 INJECTION, POWDER, FOR SOLUTION INTRAMUSCULAR; INTRAVENOUS at 20:33

## 2019-06-15 RX ADMIN — HYDROCODONE BITARTRATE AND ACETAMINOPHEN 15 ML: 7.5; 325 SOLUTION ORAL at 21:35

## 2019-06-15 RX ADMIN — DIPHENHYDRAMINE HYDROCHLORIDE 30 ML: 12.5 SOLUTION ORAL at 20:33

## 2019-06-15 RX ADMIN — CLOTRIMAZOLE 10 MG: 10 LOZENGE ORAL at 12:22

## 2019-06-15 RX ADMIN — LANSOPRAZOLE 30 MG: KIT at 06:54

## 2019-06-15 RX ADMIN — DIPHENHYDRAMINE HYDROCHLORIDE 30 ML: 12.5 SOLUTION ORAL at 17:42

## 2019-06-15 RX ADMIN — DIPHENHYDRAMINE HYDROCHLORIDE 30 ML: 12.5 SOLUTION ORAL at 09:26

## 2019-06-16 LAB
ADV 40+41 DNA STL QL NAA+NON-PROBE: NOT DETECTED
ANION GAP SERPL CALCULATED.3IONS-SCNC: 5 MMOL/L (ref 4–13)
ASTRO TYP 1-8 RNA STL QL NAA+NON-PROBE: NOT DETECTED
BUN BLD-MCNC: 17 MG/DL (ref 5–21)
BUN/CREAT SERPL: 25.4 (ref 7–25)
C CAYETANENSIS DNA STL QL NAA+NON-PROBE: NOT DETECTED
C DIFF TOX GENS STL QL NAA+PROBE: NOT DETECTED
CALCIUM SPEC-SCNC: 7.4 MG/DL (ref 8.4–10.4)
CAMPY SP DNA.DIARRHEA STL QL NAA+PROBE: NOT DETECTED
CHLORIDE SERPL-SCNC: 99 MMOL/L (ref 98–110)
CO2 SERPL-SCNC: 29 MMOL/L (ref 24–31)
CREAT BLD-MCNC: 0.67 MG/DL (ref 0.5–1.4)
CRYPTOSP STL CULT: NOT DETECTED
DEPRECATED RDW RBC AUTO: 47.3 FL (ref 40–54)
E COLI DNA SPEC QL NAA+PROBE: NOT DETECTED
E HISTOLYT AG STL-ACNC: NOT DETECTED
EAEC PAA PLAS AGGR+AATA ST NAA+NON-PRB: NOT DETECTED
EC STX1 + STX2 GENES STL NAA+PROBE: NOT DETECTED
EPEC EAE GENE STL QL NAA+NON-PROBE: NOT DETECTED
ERYTHROCYTE [DISTWIDTH] IN BLOOD BY AUTOMATED COUNT: 12.6 % (ref 12–15)
ETEC LTA+ST1A+ST1B TOX ST NAA+NON-PROBE: NOT DETECTED
G LAMBLIA DNA SPEC QL NAA+PROBE: NOT DETECTED
GFR SERPL CREATININE-BSD FRML MDRD: 86 ML/MIN/1.73
GLUCOSE BLD-MCNC: 115 MG/DL (ref 70–100)
HCT VFR BLD AUTO: 31.3 % (ref 37–47)
HGB BLD-MCNC: 10 G/DL (ref 12–16)
MCH RBC QN AUTO: 32.7 PG (ref 28–32)
MCHC RBC AUTO-ENTMCNC: 31.9 G/DL (ref 33–36)
MCV RBC AUTO: 102.3 FL (ref 82–98)
NOROVIRUS GI+II RNA STL QL NAA+NON-PROBE: NOT DETECTED
P SHIGELLOIDES DNA STL QL NAA+NON-PROBE: NOT DETECTED
PHOSPHATE SERPL-MCNC: 1.5 MG/DL (ref 2.5–4.5)
PLATELET # BLD AUTO: 137 10*3/MM3 (ref 130–400)
PMV BLD AUTO: 9.7 FL (ref 6–12)
POTASSIUM BLD-SCNC: 4.2 MMOL/L (ref 3.5–5.3)
RBC # BLD AUTO: 3.06 10*6/MM3 (ref 4.2–5.4)
RV RNA STL NAA+PROBE: NOT DETECTED
SALMONELLA DNA SPEC QL NAA+PROBE: NOT DETECTED
SAPO I+II+IV+V RNA STL QL NAA+NON-PROBE: NOT DETECTED
SHIGELLA SP+EIEC IPAH STL QL NAA+PROBE: NOT DETECTED
SODIUM BLD-SCNC: 133 MMOL/L (ref 135–145)
V CHOLERAE DNA SPEC QL NAA+PROBE: NOT DETECTED
VIBRIO DNA SPEC NAA+PROBE: NOT DETECTED
WBC NRBC COR # BLD: 6.41 10*3/MM3 (ref 4.8–10.8)
YERSINIA STL CULT: NOT DETECTED

## 2019-06-16 PROCEDURE — 87798 DETECT AGENT NOS DNA AMP: CPT | Performed by: INTERNAL MEDICINE

## 2019-06-16 PROCEDURE — 87486 CHLMYD PNEUM DNA AMP PROBE: CPT | Performed by: INTERNAL MEDICINE

## 2019-06-16 PROCEDURE — 87999 UNLISTED MICROBIOLOGY PX: CPT | Performed by: INTERNAL MEDICINE

## 2019-06-16 PROCEDURE — 84100 ASSAY OF PHOSPHORUS: CPT | Performed by: INTERNAL MEDICINE

## 2019-06-16 PROCEDURE — G0378 HOSPITAL OBSERVATION PER HR: HCPCS

## 2019-06-16 PROCEDURE — 80048 BASIC METABOLIC PNL TOTAL CA: CPT | Performed by: INTERNAL MEDICINE

## 2019-06-16 PROCEDURE — 85027 COMPLETE CBC AUTOMATED: CPT | Performed by: INTERNAL MEDICINE

## 2019-06-16 PROCEDURE — 63710000001 AZATHIOPRINE PER 50 MG: Performed by: NURSE PRACTITIONER

## 2019-06-16 PROCEDURE — 25010000002 CEFTRIAXONE PER 250 MG: Performed by: INTERNAL MEDICINE

## 2019-06-16 PROCEDURE — 25010000002 ONDANSETRON PER 1 MG: Performed by: NURSE PRACTITIONER

## 2019-06-16 PROCEDURE — 87581 M.PNEUMON DNA AMP PROBE: CPT | Performed by: INTERNAL MEDICINE

## 2019-06-16 PROCEDURE — 87633 RESP VIRUS 12-25 TARGETS: CPT | Performed by: INTERNAL MEDICINE

## 2019-06-16 RX ORDER — TRIAMCINOLONE ACETONIDE 1 MG/G
CREAM TOPICAL EVERY 12 HOURS SCHEDULED
Status: DISCONTINUED | OUTPATIENT
Start: 2019-06-16 | End: 2019-06-24 | Stop reason: HOSPADM

## 2019-06-16 RX ORDER — BETAMETHASONE DIPROPIONATE 0.05 %
OINTMENT (GRAM) TOPICAL EVERY 12 HOURS SCHEDULED
Status: DISCONTINUED | OUTPATIENT
Start: 2019-06-16 | End: 2019-06-16 | Stop reason: CLARIF

## 2019-06-16 RX ADMIN — LIDOCAINE HYDROCHLORIDE 10 ML: 20 SOLUTION ORAL; TOPICAL at 11:41

## 2019-06-16 RX ADMIN — ONDANSETRON HYDROCHLORIDE 4 MG: 2 SOLUTION INTRAMUSCULAR; INTRAVENOUS at 14:26

## 2019-06-16 RX ADMIN — DIPHENHYDRAMINE HYDROCHLORIDE 30 ML: 12.5 SOLUTION ORAL at 09:42

## 2019-06-16 RX ADMIN — ONDANSETRON HYDROCHLORIDE 4 MG: 2 SOLUTION INTRAMUSCULAR; INTRAVENOUS at 06:10

## 2019-06-16 RX ADMIN — ALPRAZOLAM 1 MG: 0.5 TABLET ORAL at 20:54

## 2019-06-16 RX ADMIN — Medication 1000 MCG: at 09:42

## 2019-06-16 RX ADMIN — CEFTRIAXONE SODIUM 1 G: 1 INJECTION, POWDER, FOR SOLUTION INTRAMUSCULAR; INTRAVENOUS at 20:55

## 2019-06-16 RX ADMIN — CLOTRIMAZOLE 10 MG: 10 LOZENGE ORAL at 06:02

## 2019-06-16 RX ADMIN — ESTRADIOL 1 MG: 1 TABLET ORAL at 09:42

## 2019-06-16 RX ADMIN — CLOTRIMAZOLE 10 MG: 10 LOZENGE ORAL at 11:36

## 2019-06-16 RX ADMIN — TRIAMCINOLONE ACETONIDE: 1 CREAM TOPICAL at 20:55

## 2019-06-16 RX ADMIN — DIPHENHYDRAMINE HYDROCHLORIDE 30 ML: 12.5 SOLUTION ORAL at 20:56

## 2019-06-16 RX ADMIN — DIPHENHYDRAMINE HYDROCHLORIDE 30 ML: 12.5 SOLUTION ORAL at 11:41

## 2019-06-16 RX ADMIN — AZATHIOPRINE 100 MG: 50 TABLET ORAL at 09:42

## 2019-06-16 RX ADMIN — LANSOPRAZOLE 30 MG: KIT at 06:02

## 2019-06-16 RX ADMIN — HYDROCODONE BITARTRATE AND ACETAMINOPHEN 15 ML: 7.5; 325 SOLUTION ORAL at 10:02

## 2019-06-16 RX ADMIN — HYDROCODONE BITARTRATE AND ACETAMINOPHEN 15 ML: 7.5; 325 SOLUTION ORAL at 20:54

## 2019-06-16 RX ADMIN — SODIUM CHLORIDE 75 ML/HR: 9 INJECTION, SOLUTION INTRAVENOUS at 11:37

## 2019-06-16 RX ADMIN — CLOTRIMAZOLE 10 MG: 10 LOZENGE ORAL at 18:03

## 2019-06-16 RX ADMIN — LATANOPROST 1 DROP: 50 SOLUTION OPHTHALMIC at 21:09

## 2019-06-16 RX ADMIN — HYDROCODONE BITARTRATE AND ACETAMINOPHEN 15 ML: 7.5; 325 SOLUTION ORAL at 04:04

## 2019-06-16 RX ADMIN — ACETAMINOPHEN 650 MG: 650 SOLUTION ORAL at 19:21

## 2019-06-17 ENCOUNTER — APPOINTMENT (OUTPATIENT)
Dept: GENERAL RADIOLOGY | Facility: HOSPITAL | Age: 79
End: 2019-06-17

## 2019-06-17 LAB
ALBUMIN SERPL-MCNC: 2.2 G/DL (ref 3.5–5)
ALBUMIN/GLOB SERPL: 1 G/DL (ref 1.1–2.5)
ALP SERPL-CCNC: 125 U/L (ref 24–120)
ALT SERPL W P-5'-P-CCNC: 73 U/L (ref 0–54)
ANION GAP SERPL CALCULATED.3IONS-SCNC: 3 MMOL/L (ref 4–13)
AST SERPL-CCNC: 26 U/L (ref 7–45)
BILIRUB SERPL-MCNC: 0.2 MG/DL (ref 0.1–1)
BUN BLD-MCNC: 16 MG/DL (ref 5–21)
BUN/CREAT SERPL: 26.7 (ref 7–25)
CALCIUM SPEC-SCNC: 7.6 MG/DL (ref 8.4–10.4)
CHLORIDE SERPL-SCNC: 99 MMOL/L (ref 98–110)
CO2 SERPL-SCNC: 31 MMOL/L (ref 24–31)
CREAT BLD-MCNC: 0.6 MG/DL (ref 0.5–1.4)
CRP SERPL-MCNC: 8.73 MG/DL (ref 0–0.99)
DEPRECATED RDW RBC AUTO: 46.5 FL (ref 40–54)
ERYTHROCYTE [DISTWIDTH] IN BLOOD BY AUTOMATED COUNT: 12.7 % (ref 12–15)
ERYTHROCYTE [SEDIMENTATION RATE] IN BLOOD: 3 MM/HR (ref 0–20)
GFR SERPL CREATININE-BSD FRML MDRD: 97 ML/MIN/1.73
GLOBULIN UR ELPH-MCNC: 2.2 GM/DL
GLUCOSE BLD-MCNC: 112 MG/DL (ref 70–100)
GLUCOSE BLDC GLUCOMTR-MCNC: 115 MG/DL (ref 70–130)
HCT VFR BLD AUTO: 31 % (ref 37–47)
HGB BLD-MCNC: 10 G/DL (ref 12–16)
LYMPHOCYTES # BLD MANUAL: 0.65 10*3/MM3 (ref 0.72–4.86)
LYMPHOCYTES NFR BLD MANUAL: 2.1 % (ref 4–12)
LYMPHOCYTES NFR BLD MANUAL: 9.3 % (ref 15–45)
MCH RBC QN AUTO: 32.2 PG (ref 28–32)
MCHC RBC AUTO-ENTMCNC: 32.3 G/DL (ref 33–36)
MCV RBC AUTO: 99.7 FL (ref 82–98)
MONOCYTES # BLD AUTO: 0.15 10*3/MM3 (ref 0.19–1.3)
NEUTROPHILS # BLD AUTO: 6.15 10*3/MM3 (ref 1.87–8.4)
NEUTROPHILS NFR BLD MANUAL: 60.8 % (ref 39–78)
NEUTS BAND NFR BLD MANUAL: 27.8 % (ref 0–10)
PLAT MORPH BLD: NORMAL
PLATELET # BLD AUTO: 182 10*3/MM3 (ref 130–400)
PMV BLD AUTO: 9.8 FL (ref 6–12)
POTASSIUM BLD-SCNC: 4 MMOL/L (ref 3.5–5.3)
PROCALCITONIN SERPL-MCNC: 0.63 NG/ML
PROT SERPL-MCNC: 4.4 G/DL (ref 6.3–8.7)
RBC # BLD AUTO: 3.11 10*6/MM3 (ref 4.2–5.4)
RBC MORPH BLD: NORMAL
SODIUM BLD-SCNC: 133 MMOL/L (ref 135–145)
WBC MORPH BLD: NORMAL
WBC NRBC COR # BLD: 6.94 10*3/MM3 (ref 4.8–10.8)

## 2019-06-17 PROCEDURE — 85651 RBC SED RATE NONAUTOMATED: CPT | Performed by: INTERNAL MEDICINE

## 2019-06-17 PROCEDURE — 85007 BL SMEAR W/DIFF WBC COUNT: CPT | Performed by: INTERNAL MEDICINE

## 2019-06-17 PROCEDURE — 80053 COMPREHEN METABOLIC PANEL: CPT | Performed by: INTERNAL MEDICINE

## 2019-06-17 PROCEDURE — 74018 RADEX ABDOMEN 1 VIEW: CPT

## 2019-06-17 PROCEDURE — 99223 1ST HOSP IP/OBS HIGH 75: CPT | Performed by: INTERNAL MEDICINE

## 2019-06-17 PROCEDURE — 25010000002 ONDANSETRON PER 1 MG: Performed by: NURSE PRACTITIONER

## 2019-06-17 PROCEDURE — 25010000002 CEFTRIAXONE PER 250 MG: Performed by: INTERNAL MEDICINE

## 2019-06-17 PROCEDURE — 82962 GLUCOSE BLOOD TEST: CPT

## 2019-06-17 PROCEDURE — 86140 C-REACTIVE PROTEIN: CPT | Performed by: INTERNAL MEDICINE

## 2019-06-17 PROCEDURE — 85025 COMPLETE CBC W/AUTO DIFF WBC: CPT | Performed by: INTERNAL MEDICINE

## 2019-06-17 PROCEDURE — 25010000002 ORPHENADRINE CITRATE PER 60 MG: Performed by: NURSE PRACTITIONER

## 2019-06-17 PROCEDURE — 84145 PROCALCITONIN (PCT): CPT | Performed by: INTERNAL MEDICINE

## 2019-06-17 RX ORDER — OMEPRAZOLE 40 MG/1
40 CAPSULE, DELAYED RELEASE ORAL DAILY
COMMUNITY
End: 2019-06-24 | Stop reason: HOSPADM

## 2019-06-17 RX ADMIN — HYDROCODONE BITARTRATE AND ACETAMINOPHEN 15 ML: 7.5; 325 SOLUTION ORAL at 08:59

## 2019-06-17 RX ADMIN — SODIUM CHLORIDE 75 ML/HR: 9 INJECTION, SOLUTION INTRAVENOUS at 15:38

## 2019-06-17 RX ADMIN — SODIUM CHLORIDE 75 ML/HR: 9 INJECTION, SOLUTION INTRAVENOUS at 15:39

## 2019-06-17 RX ADMIN — ORPHENADRINE CITRATE 60 MG: 30 INJECTION INTRAMUSCULAR; INTRAVENOUS at 08:57

## 2019-06-17 RX ADMIN — CLOTRIMAZOLE 10 MG: 10 LOZENGE ORAL at 13:17

## 2019-06-17 RX ADMIN — Medication 1000 MCG: at 08:58

## 2019-06-17 RX ADMIN — HYDROCODONE BITARTRATE AND ACETAMINOPHEN 15 ML: 7.5; 325 SOLUTION ORAL at 03:36

## 2019-06-17 RX ADMIN — LIDOCAINE HYDROCHLORIDE 10 ML: 20 SOLUTION ORAL; TOPICAL at 08:57

## 2019-06-17 RX ADMIN — CLOTRIMAZOLE 10 MG: 10 LOZENGE ORAL at 17:28

## 2019-06-17 RX ADMIN — CEFTRIAXONE SODIUM 1 G: 1 INJECTION, POWDER, FOR SOLUTION INTRAMUSCULAR; INTRAVENOUS at 22:01

## 2019-06-17 RX ADMIN — DIPHENHYDRAMINE HYDROCHLORIDE 30 ML: 12.5 SOLUTION ORAL at 09:00

## 2019-06-17 RX ADMIN — TRIAMCINOLONE ACETONIDE: 1 CREAM TOPICAL at 09:00

## 2019-06-17 RX ADMIN — TRIAMCINOLONE ACETONIDE: 1 CREAM TOPICAL at 22:03

## 2019-06-17 RX ADMIN — LANSOPRAZOLE 30 MG: KIT at 06:04

## 2019-06-17 RX ADMIN — LATANOPROST 1 DROP: 50 SOLUTION OPHTHALMIC at 22:02

## 2019-06-17 RX ADMIN — ALPRAZOLAM 1 MG: 0.5 TABLET ORAL at 08:57

## 2019-06-17 RX ADMIN — CLOTRIMAZOLE 10 MG: 10 LOZENGE ORAL at 06:04

## 2019-06-17 RX ADMIN — ESTRADIOL 1 MG: 1 TABLET ORAL at 09:01

## 2019-06-17 RX ADMIN — ALPRAZOLAM 1 MG: 0.5 TABLET ORAL at 22:13

## 2019-06-17 RX ADMIN — ONDANSETRON HYDROCHLORIDE 4 MG: 2 SOLUTION INTRAMUSCULAR; INTRAVENOUS at 15:39

## 2019-06-17 RX ADMIN — DIPHENHYDRAMINE HYDROCHLORIDE 30 ML: 12.5 SOLUTION ORAL at 22:01

## 2019-06-17 RX ADMIN — HYDROCODONE BITARTRATE AND ACETAMINOPHEN 15 ML: 7.5; 325 SOLUTION ORAL at 22:13

## 2019-06-17 RX ADMIN — ONDANSETRON HYDROCHLORIDE 4 MG: 2 SOLUTION INTRAMUSCULAR; INTRAVENOUS at 09:13

## 2019-06-17 RX ADMIN — CLOTRIMAZOLE 10 MG: 10 LOZENGE ORAL at 22:02

## 2019-06-17 RX ADMIN — CLOTRIMAZOLE 10 MG: 10 LOZENGE ORAL at 09:01

## 2019-06-17 RX ADMIN — SODIUM CHLORIDE, PRESERVATIVE FREE 3 ML: 5 INJECTION INTRAVENOUS at 09:13

## 2019-06-17 RX ADMIN — HYDROCODONE BITARTRATE AND ACETAMINOPHEN 15 ML: 7.5; 325 SOLUTION ORAL at 15:39

## 2019-06-18 LAB
ALBUMIN SERPL-MCNC: 2.7 G/DL (ref 3.5–5)
ALBUMIN/GLOB SERPL: 1.1 G/DL (ref 1.1–2.5)
ALP SERPL-CCNC: 108 U/L (ref 24–120)
ALT SERPL W P-5'-P-CCNC: 68 U/L (ref 0–54)
ANION GAP SERPL CALCULATED.3IONS-SCNC: 7 MMOL/L (ref 4–13)
AST SERPL-CCNC: 40 U/L (ref 7–45)
B PERT DNA SPEC QL NAA+PROBE: NOT DETECTED
BILIRUB SERPL-MCNC: 0.3 MG/DL (ref 0.1–1)
BUN BLD-MCNC: 13 MG/DL (ref 5–21)
BUN/CREAT SERPL: 23.6 (ref 7–25)
C PNEUM DNA NPH QL NAA+NON-PROBE: NOT DETECTED
CALCIUM SPEC-SCNC: 8.2 MG/DL (ref 8.4–10.4)
CHLORIDE SERPL-SCNC: 100 MMOL/L (ref 98–110)
CO2 SERPL-SCNC: 28 MMOL/L (ref 24–31)
CREAT BLD-MCNC: 0.55 MG/DL (ref 0.5–1.4)
FLUAV H1 2009 PAND RNA NPH QL NAA+PROBE: NOT DETECTED
FLUAV H1 HA GENE NPH QL NAA+PROBE: NOT DETECTED
FLUAV H3 RNA NPH QL NAA+PROBE: NOT DETECTED
FLUAV SUBTYP SPEC NAA+PROBE: NOT DETECTED
FLUBV RNA ISLT QL NAA+PROBE: NOT DETECTED
GFR SERPL CREATININE-BSD FRML MDRD: 107 ML/MIN/1.73
GLOBULIN UR ELPH-MCNC: 2.5 GM/DL
GLUCOSE BLD-MCNC: 77 MG/DL (ref 70–100)
HADV DNA SPEC NAA+PROBE: NOT DETECTED
HCOV 229E RNA SPEC QL NAA+PROBE: NOT DETECTED
HCOV HKU1 RNA SPEC QL NAA+PROBE: NOT DETECTED
HCOV NL63 RNA SPEC QL NAA+PROBE: NOT DETECTED
HCOV OC43 RNA SPEC QL NAA+PROBE: NOT DETECTED
HMPV RNA NPH QL NAA+NON-PROBE: NOT DETECTED
HPIV1 RNA SPEC QL NAA+PROBE: NOT DETECTED
HPIV2 RNA SPEC QL NAA+PROBE: NOT DETECTED
HPIV3 RNA NPH QL NAA+PROBE: NOT DETECTED
HPIV4 P GENE NPH QL NAA+PROBE: NOT DETECTED
M PNEUMO IGG SER IA-ACNC: NOT DETECTED
POTASSIUM BLD-SCNC: 4.5 MMOL/L (ref 3.5–5.3)
PROT SERPL-MCNC: 5.2 G/DL (ref 6.3–8.7)
RHINOVIRUS RNA SPEC NAA+PROBE: NOT DETECTED
RSV RNA NPH QL NAA+NON-PROBE: NOT DETECTED
SODIUM BLD-SCNC: 135 MMOL/L (ref 135–145)

## 2019-06-18 PROCEDURE — 99232 SBSQ HOSP IP/OBS MODERATE 35: CPT | Performed by: NURSE PRACTITIONER

## 2019-06-18 PROCEDURE — 80053 COMPREHEN METABOLIC PANEL: CPT | Performed by: INTERNAL MEDICINE

## 2019-06-18 RX ADMIN — ALPRAZOLAM 1 MG: 0.5 TABLET ORAL at 22:16

## 2019-06-18 RX ADMIN — HYDROCODONE BITARTRATE AND ACETAMINOPHEN 15 ML: 7.5; 325 SOLUTION ORAL at 11:47

## 2019-06-18 RX ADMIN — CLOTRIMAZOLE 10 MG: 10 LOZENGE ORAL at 18:13

## 2019-06-18 RX ADMIN — DIPHENHYDRAMINE HYDROCHLORIDE 30 ML: 12.5 SOLUTION ORAL at 09:30

## 2019-06-18 RX ADMIN — CLOTRIMAZOLE 10 MG: 10 LOZENGE ORAL at 09:29

## 2019-06-18 RX ADMIN — HYDROCODONE BITARTRATE AND ACETAMINOPHEN 15 ML: 7.5; 325 SOLUTION ORAL at 18:12

## 2019-06-18 RX ADMIN — TRIAMCINOLONE ACETONIDE 1 APPLICATION: 1 CREAM TOPICAL at 21:05

## 2019-06-18 RX ADMIN — LIDOCAINE HYDROCHLORIDE 10 ML: 20 SOLUTION ORAL; TOPICAL at 11:47

## 2019-06-18 RX ADMIN — SODIUM CHLORIDE, PRESERVATIVE FREE 3 ML: 5 INJECTION INTRAVENOUS at 21:01

## 2019-06-18 RX ADMIN — Medication 1000 MCG: at 09:29

## 2019-06-18 RX ADMIN — ESTRADIOL 1 MG: 1 TABLET ORAL at 09:29

## 2019-06-18 RX ADMIN — DIPHENHYDRAMINE HYDROCHLORIDE 30 ML: 12.5 SOLUTION ORAL at 21:01

## 2019-06-18 RX ADMIN — LATANOPROST 1 DROP: 50 SOLUTION OPHTHALMIC at 21:01

## 2019-06-18 RX ADMIN — SODIUM CHLORIDE, PRESERVATIVE FREE 3 ML: 5 INJECTION INTRAVENOUS at 09:29

## 2019-06-18 RX ADMIN — CLOTRIMAZOLE 10 MG: 10 LOZENGE ORAL at 06:28

## 2019-06-18 RX ADMIN — DIPHENHYDRAMINE HYDROCHLORIDE 30 ML: 12.5 SOLUTION ORAL at 11:48

## 2019-06-18 RX ADMIN — DIPHENHYDRAMINE HYDROCHLORIDE 30 ML: 12.5 SOLUTION ORAL at 18:13

## 2019-06-18 RX ADMIN — SODIUM CHLORIDE 75 ML/HR: 9 INJECTION, SOLUTION INTRAVENOUS at 05:21

## 2019-06-18 RX ADMIN — TRIAMCINOLONE ACETONIDE: 1 CREAM TOPICAL at 09:29

## 2019-06-18 RX ADMIN — LANSOPRAZOLE 30 MG: KIT at 06:28

## 2019-06-18 RX ADMIN — CLOTRIMAZOLE 10 MG: 10 LOZENGE ORAL at 21:01

## 2019-06-18 RX ADMIN — LIDOCAINE HYDROCHLORIDE 10 ML: 20 SOLUTION ORAL; TOPICAL at 18:13

## 2019-06-18 RX ADMIN — ALPRAZOLAM 1 MG: 0.5 TABLET ORAL at 09:28

## 2019-06-18 RX ADMIN — HYDROCODONE BITARTRATE AND ACETAMINOPHEN 15 ML: 7.5; 325 SOLUTION ORAL at 22:16

## 2019-06-18 RX ADMIN — CLOTRIMAZOLE 10 MG: 10 LOZENGE ORAL at 11:48

## 2019-06-19 LAB
ALBUMIN SERPL-MCNC: 2.5 G/DL (ref 3.5–5)
ALBUMIN/GLOB SERPL: 1 G/DL (ref 1.1–2.5)
ALP SERPL-CCNC: 92 U/L (ref 24–120)
ALT SERPL W P-5'-P-CCNC: 61 U/L (ref 0–54)
ANION GAP SERPL CALCULATED.3IONS-SCNC: 4 MMOL/L (ref 4–13)
AST SERPL-CCNC: 40 U/L (ref 7–45)
BACTERIA SPEC AEROBE CULT: NORMAL
BACTERIA SPEC AEROBE CULT: NORMAL
BASOPHILS # BLD AUTO: 0.04 10*3/MM3 (ref 0–0.2)
BASOPHILS NFR BLD AUTO: 0.8 % (ref 0–2)
BILIRUB SERPL-MCNC: 0.2 MG/DL (ref 0.1–1)
BUN BLD-MCNC: 13 MG/DL (ref 5–21)
BUN/CREAT SERPL: 16.7 (ref 7–25)
CALCIUM SPEC-SCNC: 8.5 MG/DL (ref 8.4–10.4)
CHLORIDE SERPL-SCNC: 98 MMOL/L (ref 98–110)
CO2 SERPL-SCNC: 35 MMOL/L (ref 24–31)
CREAT BLD-MCNC: 0.78 MG/DL (ref 0.5–1.4)
CRP SERPL-MCNC: 3.74 MG/DL (ref 0–0.99)
DEPRECATED RDW RBC AUTO: 44.9 FL (ref 40–54)
EOSINOPHIL # BLD AUTO: 0.26 10*3/MM3 (ref 0–0.7)
EOSINOPHIL NFR BLD AUTO: 5 % (ref 0–4)
ERYTHROCYTE [DISTWIDTH] IN BLOOD BY AUTOMATED COUNT: 12.5 % (ref 12–15)
GFR SERPL CREATININE-BSD FRML MDRD: 72 ML/MIN/1.73
GLOBULIN UR ELPH-MCNC: 2.6 GM/DL
GLUCOSE BLD-MCNC: 81 MG/DL (ref 70–100)
HCT VFR BLD AUTO: 30 % (ref 37–47)
HGB BLD-MCNC: 9.8 G/DL (ref 12–16)
IMM GRANULOCYTES # BLD AUTO: 0.12 10*3/MM3 (ref 0–0.05)
IMM GRANULOCYTES NFR BLD AUTO: 2.3 % (ref 0–5)
LYMPHOCYTES # BLD AUTO: 1.2 10*3/MM3 (ref 0.72–4.86)
LYMPHOCYTES NFR BLD AUTO: 23.3 % (ref 15–45)
MCH RBC QN AUTO: 32 PG (ref 28–32)
MCHC RBC AUTO-ENTMCNC: 32.7 G/DL (ref 33–36)
MCV RBC AUTO: 98 FL (ref 82–98)
MONOCYTES # BLD AUTO: 0.5 10*3/MM3 (ref 0.19–1.3)
MONOCYTES NFR BLD AUTO: 9.7 % (ref 4–12)
NEUTROPHILS # BLD AUTO: 3.03 10*3/MM3 (ref 1.87–8.4)
NEUTROPHILS NFR BLD AUTO: 58.9 % (ref 39–78)
NRBC BLD AUTO-RTO: 0 /100 WBC (ref 0–0.2)
PLATELET # BLD AUTO: 317 10*3/MM3 (ref 130–400)
PMV BLD AUTO: 9.4 FL (ref 6–12)
POTASSIUM BLD-SCNC: 4 MMOL/L (ref 3.5–5.3)
PROT SERPL-MCNC: 5.1 G/DL (ref 6.3–8.7)
RBC # BLD AUTO: 3.06 10*6/MM3 (ref 4.2–5.4)
SODIUM BLD-SCNC: 137 MMOL/L (ref 135–145)
WBC NRBC COR # BLD: 5.15 10*3/MM3 (ref 4.8–10.8)

## 2019-06-19 PROCEDURE — 80053 COMPREHEN METABOLIC PANEL: CPT | Performed by: INTERNAL MEDICINE

## 2019-06-19 PROCEDURE — 25010000002 METHYLPREDNISOLONE PER 125 MG: Performed by: INTERNAL MEDICINE

## 2019-06-19 PROCEDURE — 85025 COMPLETE CBC W/AUTO DIFF WBC: CPT | Performed by: INTERNAL MEDICINE

## 2019-06-19 PROCEDURE — 86140 C-REACTIVE PROTEIN: CPT | Performed by: INTERNAL MEDICINE

## 2019-06-19 PROCEDURE — 25010000002 ONDANSETRON PER 1 MG: Performed by: NURSE PRACTITIONER

## 2019-06-19 RX ORDER — METHYLPREDNISOLONE SODIUM SUCCINATE 125 MG/2ML
80 INJECTION, POWDER, LYOPHILIZED, FOR SOLUTION INTRAMUSCULAR; INTRAVENOUS EVERY 12 HOURS SCHEDULED
Status: DISCONTINUED | OUTPATIENT
Start: 2019-06-19 | End: 2019-06-20

## 2019-06-19 RX ADMIN — METHYLPREDNISOLONE SODIUM SUCCINATE 80 MG: 125 INJECTION, POWDER, FOR SOLUTION INTRAMUSCULAR; INTRAVENOUS at 11:01

## 2019-06-19 RX ADMIN — TRIAMCINOLONE ACETONIDE: 1 CREAM TOPICAL at 08:09

## 2019-06-19 RX ADMIN — DIPHENHYDRAMINE HYDROCHLORIDE 30 ML: 12.5 SOLUTION ORAL at 08:08

## 2019-06-19 RX ADMIN — HYDROCODONE BITARTRATE AND ACETAMINOPHEN 15 ML: 7.5; 325 SOLUTION ORAL at 08:08

## 2019-06-19 RX ADMIN — HYDROCODONE BITARTRATE AND ACETAMINOPHEN 15 ML: 7.5; 325 SOLUTION ORAL at 12:04

## 2019-06-19 RX ADMIN — SODIUM CHLORIDE, PRESERVATIVE FREE 3 ML: 5 INJECTION INTRAVENOUS at 11:02

## 2019-06-19 RX ADMIN — DIPHENHYDRAMINE HYDROCHLORIDE 30 ML: 12.5 SOLUTION ORAL at 17:46

## 2019-06-19 RX ADMIN — CLOTRIMAZOLE 10 MG: 10 LOZENGE ORAL at 16:07

## 2019-06-19 RX ADMIN — HYDROCODONE BITARTRATE AND ACETAMINOPHEN 15 ML: 7.5; 325 SOLUTION ORAL at 20:38

## 2019-06-19 RX ADMIN — HYDROCODONE BITARTRATE AND ACETAMINOPHEN 15 ML: 7.5; 325 SOLUTION ORAL at 16:07

## 2019-06-19 RX ADMIN — ONDANSETRON HYDROCHLORIDE 4 MG: 2 SOLUTION INTRAMUSCULAR; INTRAVENOUS at 12:10

## 2019-06-19 RX ADMIN — ALPRAZOLAM 1 MG: 0.5 TABLET ORAL at 20:38

## 2019-06-19 RX ADMIN — LIDOCAINE HYDROCHLORIDE 10 ML: 20 SOLUTION ORAL; TOPICAL at 08:17

## 2019-06-19 RX ADMIN — LATANOPROST 1 DROP: 50 SOLUTION OPHTHALMIC at 20:40

## 2019-06-19 RX ADMIN — METHYLPREDNISOLONE SODIUM SUCCINATE 80 MG: 125 INJECTION, POWDER, FOR SOLUTION INTRAMUSCULAR; INTRAVENOUS at 20:50

## 2019-06-19 RX ADMIN — Medication 1000 MCG: at 08:10

## 2019-06-19 RX ADMIN — DIPHENHYDRAMINE HYDROCHLORIDE 30 ML: 12.5 SOLUTION ORAL at 12:04

## 2019-06-19 RX ADMIN — DIPHENHYDRAMINE HYDROCHLORIDE 30 ML: 12.5 SOLUTION ORAL at 20:38

## 2019-06-19 RX ADMIN — CLOTRIMAZOLE 10 MG: 10 LOZENGE ORAL at 20:42

## 2019-06-19 RX ADMIN — CLOTRIMAZOLE 10 MG: 10 LOZENGE ORAL at 11:04

## 2019-06-19 RX ADMIN — TRIAMCINOLONE ACETONIDE: 1 CREAM TOPICAL at 20:39

## 2019-06-19 RX ADMIN — LANSOPRAZOLE 30 MG: KIT at 06:01

## 2019-06-19 RX ADMIN — ONDANSETRON HYDROCHLORIDE 4 MG: 2 SOLUTION INTRAMUSCULAR; INTRAVENOUS at 17:46

## 2019-06-19 RX ADMIN — ESTRADIOL 1 MG: 1 TABLET ORAL at 08:11

## 2019-06-19 RX ADMIN — CLOTRIMAZOLE 10 MG: 10 LOZENGE ORAL at 06:01

## 2019-06-20 ENCOUNTER — ANESTHESIA (OUTPATIENT)
Dept: PERIOP | Facility: HOSPITAL | Age: 79
End: 2019-06-20

## 2019-06-20 ENCOUNTER — ANESTHESIA EVENT (OUTPATIENT)
Dept: PERIOP | Facility: HOSPITAL | Age: 79
End: 2019-06-20

## 2019-06-20 LAB
BACTERIA SPEC AEROBE CULT: NORMAL
BACTERIA SPEC AEROBE CULT: NORMAL

## 2019-06-20 PROCEDURE — 25010000002 MORPHINE PER 10 MG: Performed by: ANESTHESIOLOGY

## 2019-06-20 PROCEDURE — 25010000002 METHYLPREDNISOLONE PER 125 MG: Performed by: INTERNAL MEDICINE

## 2019-06-20 PROCEDURE — 25010000002 ONDANSETRON PER 1 MG: Performed by: ANESTHESIOLOGY

## 2019-06-20 PROCEDURE — 25010000002 MIDAZOLAM PER 1 MG: Performed by: ANESTHESIOLOGY

## 2019-06-20 PROCEDURE — 25010000002 MORPHINE PER 10 MG: Performed by: SPECIALIST

## 2019-06-20 PROCEDURE — 25010000002 ONDANSETRON PER 1 MG: Performed by: NURSE ANESTHETIST, CERTIFIED REGISTERED

## 2019-06-20 PROCEDURE — 25010000002 FENTANYL CITRATE (PF) 100 MCG/2ML SOLUTION: Performed by: ANESTHESIOLOGY

## 2019-06-20 PROCEDURE — 25010000002 METOCLOPRAMIDE PER 10 MG: Performed by: ANESTHESIOLOGY

## 2019-06-20 PROCEDURE — 25010000002 FENTANYL CITRATE (PF) 100 MCG/2ML SOLUTION

## 2019-06-20 PROCEDURE — 25010000002 ONDANSETRON PER 1 MG: Performed by: NURSE PRACTITIONER

## 2019-06-20 PROCEDURE — 25010000002 FENTANYL CITRATE (PF) 100 MCG/2ML SOLUTION: Performed by: NURSE ANESTHETIST, CERTIFIED REGISTERED

## 2019-06-20 PROCEDURE — 25010000002 DEXAMETHASONE PER 1 MG: Performed by: NURSE ANESTHETIST, CERTIFIED REGISTERED

## 2019-06-20 PROCEDURE — C1765 ADHESION BARRIER: HCPCS | Performed by: SPECIALIST

## 2019-06-20 PROCEDURE — 25010000002 PROPOFOL 10 MG/ML EMULSION: Performed by: NURSE ANESTHETIST, CERTIFIED REGISTERED

## 2019-06-20 PROCEDURE — 0DH60UZ INSERTION OF FEEDING DEVICE INTO STOMACH, OPEN APPROACH: ICD-10-PCS | Performed by: SPECIALIST

## 2019-06-20 RX ORDER — MAGNESIUM HYDROXIDE 1200 MG/15ML
LIQUID ORAL AS NEEDED
Status: DISCONTINUED | OUTPATIENT
Start: 2019-06-20 | End: 2019-06-20 | Stop reason: HOSPADM

## 2019-06-20 RX ORDER — IPRATROPIUM BROMIDE AND ALBUTEROL SULFATE 2.5; .5 MG/3ML; MG/3ML
3 SOLUTION RESPIRATORY (INHALATION) ONCE AS NEEDED
Status: DISCONTINUED | OUTPATIENT
Start: 2019-06-20 | End: 2019-06-20 | Stop reason: HOSPADM

## 2019-06-20 RX ORDER — ONDANSETRON 2 MG/ML
INJECTION INTRAMUSCULAR; INTRAVENOUS AS NEEDED
Status: DISCONTINUED | OUTPATIENT
Start: 2019-06-20 | End: 2019-06-20 | Stop reason: SURG

## 2019-06-20 RX ORDER — SODIUM CHLORIDE 0.9 % (FLUSH) 0.9 %
3 SYRINGE (ML) INJECTION EVERY 12 HOURS SCHEDULED
Status: DISCONTINUED | OUTPATIENT
Start: 2019-06-20 | End: 2019-06-20 | Stop reason: HOSPADM

## 2019-06-20 RX ORDER — FENTANYL CITRATE 50 UG/ML
INJECTION, SOLUTION INTRAMUSCULAR; INTRAVENOUS AS NEEDED
Status: DISCONTINUED | OUTPATIENT
Start: 2019-06-20 | End: 2019-06-20 | Stop reason: SURG

## 2019-06-20 RX ORDER — LABETALOL HYDROCHLORIDE 5 MG/ML
5 INJECTION, SOLUTION INTRAVENOUS
Status: DISCONTINUED | OUTPATIENT
Start: 2019-06-20 | End: 2019-06-20 | Stop reason: HOSPADM

## 2019-06-20 RX ORDER — NALOXONE HCL 0.4 MG/ML
0.04 VIAL (ML) INJECTION AS NEEDED
Status: DISCONTINUED | OUTPATIENT
Start: 2019-06-20 | End: 2019-06-20 | Stop reason: HOSPADM

## 2019-06-20 RX ORDER — HYDRALAZINE HYDROCHLORIDE 20 MG/ML
5 INJECTION INTRAMUSCULAR; INTRAVENOUS
Status: DISCONTINUED | OUTPATIENT
Start: 2019-06-20 | End: 2019-06-20 | Stop reason: HOSPADM

## 2019-06-20 RX ORDER — ROCURONIUM BROMIDE 10 MG/ML
INJECTION, SOLUTION INTRAVENOUS AS NEEDED
Status: DISCONTINUED | OUTPATIENT
Start: 2019-06-20 | End: 2019-06-20 | Stop reason: SURG

## 2019-06-20 RX ORDER — FENTANYL CITRATE 50 UG/ML
25 INJECTION, SOLUTION INTRAMUSCULAR; INTRAVENOUS AS NEEDED
Status: DISCONTINUED | OUTPATIENT
Start: 2019-06-20 | End: 2019-06-20 | Stop reason: HOSPADM

## 2019-06-20 RX ORDER — FENTANYL CITRATE 50 UG/ML
INJECTION, SOLUTION INTRAMUSCULAR; INTRAVENOUS
Status: COMPLETED
Start: 2019-06-20 | End: 2019-06-20

## 2019-06-20 RX ORDER — ONDANSETRON 2 MG/ML
4 INJECTION INTRAMUSCULAR; INTRAVENOUS EVERY 4 HOURS PRN
Status: DISCONTINUED | OUTPATIENT
Start: 2019-06-20 | End: 2019-06-24 | Stop reason: HOSPADM

## 2019-06-20 RX ORDER — FLUMAZENIL 0.1 MG/ML
0.2 INJECTION INTRAVENOUS AS NEEDED
Status: DISCONTINUED | OUTPATIENT
Start: 2019-06-20 | End: 2019-06-20 | Stop reason: HOSPADM

## 2019-06-20 RX ORDER — PROPOFOL 10 MG/ML
VIAL (ML) INTRAVENOUS AS NEEDED
Status: DISCONTINUED | OUTPATIENT
Start: 2019-06-20 | End: 2019-06-20 | Stop reason: SURG

## 2019-06-20 RX ORDER — SODIUM CHLORIDE, SODIUM LACTATE, POTASSIUM CHLORIDE, CALCIUM CHLORIDE 600; 310; 30; 20 MG/100ML; MG/100ML; MG/100ML; MG/100ML
100 INJECTION, SOLUTION INTRAVENOUS CONTINUOUS
Status: DISCONTINUED | OUTPATIENT
Start: 2019-06-20 | End: 2019-06-20

## 2019-06-20 RX ORDER — MORPHINE SULFATE 2 MG/ML
2 INJECTION, SOLUTION INTRAMUSCULAR; INTRAVENOUS
Status: COMPLETED | OUTPATIENT
Start: 2019-06-20 | End: 2019-06-20

## 2019-06-20 RX ORDER — FAMOTIDINE 10 MG/ML
20 INJECTION, SOLUTION INTRAVENOUS
Status: COMPLETED | OUTPATIENT
Start: 2019-06-20 | End: 2019-06-20

## 2019-06-20 RX ORDER — DEXAMETHASONE SODIUM PHOSPHATE 4 MG/ML
INJECTION, SOLUTION INTRA-ARTICULAR; INTRALESIONAL; INTRAMUSCULAR; INTRAVENOUS; SOFT TISSUE AS NEEDED
Status: DISCONTINUED | OUTPATIENT
Start: 2019-06-20 | End: 2019-06-20 | Stop reason: SURG

## 2019-06-20 RX ORDER — SODIUM CHLORIDE 0.9 % (FLUSH) 0.9 %
1-10 SYRINGE (ML) INJECTION AS NEEDED
Status: DISCONTINUED | OUTPATIENT
Start: 2019-06-20 | End: 2019-06-20 | Stop reason: HOSPADM

## 2019-06-20 RX ORDER — MIDAZOLAM HYDROCHLORIDE 1 MG/ML
1 INJECTION INTRAMUSCULAR; INTRAVENOUS
Status: DISCONTINUED | OUTPATIENT
Start: 2019-06-20 | End: 2019-06-20 | Stop reason: HOSPADM

## 2019-06-20 RX ORDER — METHYLPREDNISOLONE SODIUM SUCCINATE 125 MG/2ML
60 INJECTION, POWDER, LYOPHILIZED, FOR SOLUTION INTRAMUSCULAR; INTRAVENOUS EVERY 12 HOURS SCHEDULED
Status: DISCONTINUED | OUTPATIENT
Start: 2019-06-20 | End: 2019-06-21

## 2019-06-20 RX ORDER — ONDANSETRON 2 MG/ML
4 INJECTION INTRAMUSCULAR; INTRAVENOUS AS NEEDED
Status: DISCONTINUED | OUTPATIENT
Start: 2019-06-20 | End: 2019-06-20 | Stop reason: HOSPADM

## 2019-06-20 RX ORDER — MIDAZOLAM HYDROCHLORIDE 1 MG/ML
2 INJECTION INTRAMUSCULAR; INTRAVENOUS
Status: DISCONTINUED | OUTPATIENT
Start: 2019-06-20 | End: 2019-06-20 | Stop reason: HOSPADM

## 2019-06-20 RX ORDER — METOCLOPRAMIDE HYDROCHLORIDE 5 MG/ML
5 INJECTION INTRAMUSCULAR; INTRAVENOUS
Status: DISCONTINUED | OUTPATIENT
Start: 2019-06-20 | End: 2019-06-20 | Stop reason: HOSPADM

## 2019-06-20 RX ORDER — METOCLOPRAMIDE HYDROCHLORIDE 5 MG/ML
10 INJECTION INTRAMUSCULAR; INTRAVENOUS ONCE AS NEEDED
Status: COMPLETED | OUTPATIENT
Start: 2019-06-20 | End: 2019-06-20

## 2019-06-20 RX ADMIN — MORPHINE SULFATE 1 MG: 2 INJECTION, SOLUTION INTRAMUSCULAR; INTRAVENOUS at 17:45

## 2019-06-20 RX ADMIN — METHYLPREDNISOLONE SODIUM SUCCINATE 80 MG: 125 INJECTION, POWDER, FOR SOLUTION INTRAMUSCULAR; INTRAVENOUS at 07:58

## 2019-06-20 RX ADMIN — CLOTRIMAZOLE 10 MG: 10 LOZENGE ORAL at 05:45

## 2019-06-20 RX ADMIN — TRIAMCINOLONE ACETONIDE: 1 CREAM TOPICAL at 08:00

## 2019-06-20 RX ADMIN — DIPHENHYDRAMINE HYDROCHLORIDE 30 ML: 12.5 SOLUTION ORAL at 12:32

## 2019-06-20 RX ADMIN — CLOTRIMAZOLE 10 MG: 10 LOZENGE ORAL at 12:32

## 2019-06-20 RX ADMIN — FENTANYL CITRATE 100 MCG: 50 INJECTION, SOLUTION INTRAMUSCULAR; INTRAVENOUS at 15:13

## 2019-06-20 RX ADMIN — FAMOTIDINE 20 MG: 10 INJECTION, SOLUTION INTRAVENOUS at 14:53

## 2019-06-20 RX ADMIN — ONDANSETRON HYDROCHLORIDE 4 MG: 2 SOLUTION INTRAMUSCULAR; INTRAVENOUS at 15:37

## 2019-06-20 RX ADMIN — LIDOCAINE HYDROCHLORIDE 10 ML: 20 SOLUTION ORAL; TOPICAL at 05:45

## 2019-06-20 RX ADMIN — SODIUM CHLORIDE, PRESERVATIVE FREE 3 ML: 5 INJECTION INTRAVENOUS at 07:59

## 2019-06-20 RX ADMIN — Medication 1000 MCG: at 08:00

## 2019-06-20 RX ADMIN — ROCURONIUM BROMIDE 10 MG: 10 INJECTION INTRAVENOUS at 15:13

## 2019-06-20 RX ADMIN — HYDROCODONE BITARTRATE AND ACETAMINOPHEN 15 ML: 7.5; 325 SOLUTION ORAL at 05:45

## 2019-06-20 RX ADMIN — MORPHINE SULFATE 4 MG: 4 INJECTION INTRAVENOUS at 21:35

## 2019-06-20 RX ADMIN — ESTRADIOL 1 MG: 1 TABLET ORAL at 08:00

## 2019-06-20 RX ADMIN — CEFAZOLIN 1 G: 1 INJECTION, POWDER, FOR SOLUTION INTRAMUSCULAR; INTRAVENOUS; PARENTERAL at 15:22

## 2019-06-20 RX ADMIN — HYDROCODONE BITARTRATE AND ACETAMINOPHEN 15 ML: 7.5; 325 SOLUTION ORAL at 12:32

## 2019-06-20 RX ADMIN — SODIUM CHLORIDE, PRESERVATIVE FREE 3 ML: 5 INJECTION INTRAVENOUS at 21:36

## 2019-06-20 RX ADMIN — MORPHINE SULFATE 2 MG: 2 INJECTION, SOLUTION INTRAMUSCULAR; INTRAVENOUS at 16:31

## 2019-06-20 RX ADMIN — MIDAZOLAM HYDROCHLORIDE 2 MG: 1 INJECTION, SOLUTION INTRAMUSCULAR; INTRAVENOUS at 14:53

## 2019-06-20 RX ADMIN — MORPHINE SULFATE 2 MG: 2 INJECTION, SOLUTION INTRAMUSCULAR; INTRAVENOUS at 16:56

## 2019-06-20 RX ADMIN — MORPHINE SULFATE 1 MG: 2 INJECTION, SOLUTION INTRAMUSCULAR; INTRAVENOUS at 17:35

## 2019-06-20 RX ADMIN — SODIUM CHLORIDE, POTASSIUM CHLORIDE, SODIUM LACTATE AND CALCIUM CHLORIDE 100 ML/HR: 600; 310; 30; 20 INJECTION, SOLUTION INTRAVENOUS at 15:08

## 2019-06-20 RX ADMIN — DEXAMETHASONE SODIUM PHOSPHATE 4 MG: 4 INJECTION, SOLUTION INTRAMUSCULAR; INTRAVENOUS at 15:37

## 2019-06-20 RX ADMIN — MORPHINE SULFATE 2 MG: 2 INJECTION, SOLUTION INTRAMUSCULAR; INTRAVENOUS at 17:06

## 2019-06-20 RX ADMIN — LATANOPROST 1 DROP: 50 SOLUTION OPHTHALMIC at 21:36

## 2019-06-20 RX ADMIN — ONDANSETRON 4 MG: 2 INJECTION INTRAMUSCULAR; INTRAVENOUS at 16:26

## 2019-06-20 RX ADMIN — ONDANSETRON HYDROCHLORIDE 4 MG: 2 SOLUTION INTRAMUSCULAR; INTRAVENOUS at 05:55

## 2019-06-20 RX ADMIN — FENTANYL CITRATE 100 MCG: 50 INJECTION INTRAMUSCULAR; INTRAVENOUS at 16:29

## 2019-06-20 RX ADMIN — METHYLPREDNISOLONE SODIUM SUCCINATE 60 MG: 125 INJECTION, POWDER, FOR SOLUTION INTRAMUSCULAR; INTRAVENOUS at 21:35

## 2019-06-20 RX ADMIN — PROPOFOL 70 MG: 10 INJECTION, EMULSION INTRAVENOUS at 15:13

## 2019-06-20 RX ADMIN — FENTANYL CITRATE 100 MCG: 50 INJECTION INTRAMUSCULAR; INTRAVENOUS at 16:08

## 2019-06-20 RX ADMIN — LABETALOL HYDROCHLORIDE 5 MG: 5 INJECTION INTRAVENOUS at 17:35

## 2019-06-20 RX ADMIN — ONDANSETRON HYDROCHLORIDE 4 MG: 2 SOLUTION INTRAMUSCULAR; INTRAVENOUS at 12:32

## 2019-06-20 RX ADMIN — LANSOPRAZOLE 30 MG: KIT at 05:45

## 2019-06-20 RX ADMIN — MORPHINE SULFATE 2 MG: 2 INJECTION, SOLUTION INTRAMUSCULAR; INTRAVENOUS at 16:43

## 2019-06-20 RX ADMIN — METOCLOPRAMIDE 10 MG: 5 INJECTION, SOLUTION INTRAMUSCULAR; INTRAVENOUS at 14:53

## 2019-06-20 NOTE — ANESTHESIA POSTPROCEDURE EVALUATION
Patient: Olive Bowman    Procedure Summary     Date:  06/20/19 Room / Location:   [facility] OR 03 /  [facility] OR    Anesthesia Start:  1511 Anesthesia Stop:  1604    Procedure:  GASTROSTOMY FEEDING TUBE INSERTION (N/A Abdomen) Diagnosis:       Esophageal stricture      (Esophageal stricture [K22.2])    Surgeon:  Doreen White MD Provider:  Jamin Evangelista CRNA    Anesthesia Type:  general ASA Status:  2          Anesthesia Type: general  Last vitals  BP   162/85 (06/20/19 1630)   Temp   99 °F (37.2 °C) (06/20/19 1605)   Pulse   113 (06/20/19 1630)   Resp   14 (06/20/19 1630)     SpO2   100 % (06/20/19 1630)     Post Anesthesia Care and Evaluation    Patient location during evaluation: PACU  Patient participation: complete - patient participated  Level of consciousness: awake and alert  Pain score: 0  Pain management: adequate  Airway patency: patent  Anesthetic complications: No anesthetic complications    Cardiovascular status: acceptable and stable  Respiratory status: acceptable and unassisted  Hydration status: acceptable

## 2019-06-20 NOTE — ANESTHESIA PREPROCEDURE EVALUATION
Anesthesia Evaluation     Patient summary reviewed   no history of anesthetic complications:  NPO Solid Status: > 8 hours  NPO Liquid Status: > 6 hours           Airway   Mallampati: II  TM distance: >3 FB  Neck ROM: full  Dental - normal exam     Pulmonary - normal exam    breath sounds clear to auscultation  (+) sleep apnea on CPAP,   (-) asthma, recent URI, not a smoker  Cardiovascular - normal exam  Exercise tolerance: good (4-7 METS)    ECG reviewed  Rhythm: regular  Rate: normal    (-) pacemaker, hypertension, past MI, angina, cardiac stents      Neuro/Psych  (-) seizures, TIA, CVA  GI/Hepatic/Renal/Endo    (+)  GERD,    (-) liver disease, no renal disease, diabetes, hypothyroidism, hyperthyroidism    ROS Comment: Dysphagia, difficulty swallowing, diffuse esophageal stricture per EGD  Underweight    Musculoskeletal         ROS comment: Fibromyalgia  Abdominal    Substance History      OB/GYN          Other   (+) arthritis     ROS/Med Hx Other: Reports having an autoimmune disorder, but is unsure of the exact nature of the disorder, consists of xerostomia, mouth ulcers, lichen planus                  Anesthesia Plan    ASA 2     general     intravenous induction   Anesthetic plan, all risks, benefits, and alternatives have been provided, discussed and informed consent has been obtained with: patient.

## 2019-06-20 NOTE — ANESTHESIA PROCEDURE NOTES
Airway  Airway not difficult    General Information and Staff    Patient location during procedure: OR  CRNA: Jamin Evangelista CRNA    Indications and Patient Condition  Indications for airway management: airway protection    Preoxygenated: yes  Mask difficulty assessment: 0 - not attempted    Final Airway Details  Final airway type: endotracheal airway      Successful airway: ETT  Cuffed: yes   Successful intubation technique: direct laryngoscopy  Endotracheal tube insertion site: oral  Blade: Prosper  Blade size: 3  ETT size (mm): 7.0  Cormack-Lehane Classification: grade I - full view of glottis  Placement verified by: chest auscultation and capnometry   Cuff volume (mL): 3  Measured from: lips  ETT to lips (cm): 20  Number of attempts at approach: 1    Additional Comments  ATRAUMATIC INTUBATION

## 2019-06-21 LAB
ALBUMIN SERPL-MCNC: 3.3 G/DL (ref 3.5–5)
ALBUMIN/GLOB SERPL: 1.1 G/DL (ref 1.1–2.5)
ALP SERPL-CCNC: 93 U/L (ref 24–120)
ALT SERPL W P-5'-P-CCNC: 57 U/L (ref 0–54)
ANION GAP SERPL CALCULATED.3IONS-SCNC: 8 MMOL/L (ref 4–13)
AST SERPL-CCNC: 75 U/L (ref 7–45)
BILIRUB SERPL-MCNC: 0.4 MG/DL (ref 0.1–1)
BUN BLD-MCNC: 18 MG/DL (ref 5–21)
BUN/CREAT SERPL: 20.2 (ref 7–25)
CALCIUM SPEC-SCNC: 8.7 MG/DL (ref 8.4–10.4)
CHLORIDE SERPL-SCNC: 94 MMOL/L (ref 98–110)
CO2 SERPL-SCNC: 35 MMOL/L (ref 24–31)
CREAT BLD-MCNC: 0.89 MG/DL (ref 0.5–1.4)
DEPRECATED RDW RBC AUTO: 45 FL (ref 40–54)
ERYTHROCYTE [DISTWIDTH] IN BLOOD BY AUTOMATED COUNT: 12.5 % (ref 12–15)
GFR SERPL CREATININE-BSD FRML MDRD: 62 ML/MIN/1.73
GLOBULIN UR ELPH-MCNC: 2.9 GM/DL
GLUCOSE BLD-MCNC: 126 MG/DL (ref 70–100)
HCT VFR BLD AUTO: 30.5 % (ref 37–47)
HGB BLD-MCNC: 10 G/DL (ref 12–16)
MCH RBC QN AUTO: 32.1 PG (ref 28–32)
MCHC RBC AUTO-ENTMCNC: 32.8 G/DL (ref 33–36)
MCV RBC AUTO: 97.8 FL (ref 82–98)
PLATELET # BLD AUTO: 504 10*3/MM3 (ref 130–400)
PMV BLD AUTO: 8.7 FL (ref 6–12)
POTASSIUM BLD-SCNC: 3.6 MMOL/L (ref 3.5–5.3)
PREALB SERPL-MCNC: 19.4 MG/DL (ref 18–36)
PROT SERPL-MCNC: 6.2 G/DL (ref 6.3–8.7)
RBC # BLD AUTO: 3.12 10*6/MM3 (ref 4.2–5.4)
SODIUM BLD-SCNC: 137 MMOL/L (ref 135–145)
WBC NRBC COR # BLD: 15.65 10*3/MM3 (ref 4.8–10.8)

## 2019-06-21 PROCEDURE — 25010000002 METHYLPREDNISOLONE PER 40 MG: Performed by: INTERNAL MEDICINE

## 2019-06-21 PROCEDURE — 80053 COMPREHEN METABOLIC PANEL: CPT | Performed by: INTERNAL MEDICINE

## 2019-06-21 PROCEDURE — 25010000002 METHYLPREDNISOLONE PER 125 MG: Performed by: INTERNAL MEDICINE

## 2019-06-21 PROCEDURE — 84134 ASSAY OF PREALBUMIN: CPT | Performed by: INTERNAL MEDICINE

## 2019-06-21 PROCEDURE — 25010000002 MORPHINE PER 10 MG: Performed by: SPECIALIST

## 2019-06-21 PROCEDURE — 85027 COMPLETE CBC AUTOMATED: CPT | Performed by: INTERNAL MEDICINE

## 2019-06-21 PROCEDURE — 25010000002 LORAZEPAM PER 2 MG: Performed by: FAMILY MEDICINE

## 2019-06-21 RX ORDER — METHYLPREDNISOLONE SODIUM SUCCINATE 40 MG/ML
40 INJECTION, POWDER, LYOPHILIZED, FOR SOLUTION INTRAMUSCULAR; INTRAVENOUS EVERY 12 HOURS SCHEDULED
Status: DISCONTINUED | OUTPATIENT
Start: 2019-06-21 | End: 2019-06-22

## 2019-06-21 RX ORDER — CARVEDILOL 3.12 MG/1
3.12 TABLET ORAL 2 TIMES DAILY WITH MEALS
Status: DISCONTINUED | OUTPATIENT
Start: 2019-06-21 | End: 2019-06-24 | Stop reason: HOSPADM

## 2019-06-21 RX ORDER — DEXTROSE, SODIUM CHLORIDE, SODIUM LACTATE, POTASSIUM CHLORIDE, AND CALCIUM CHLORIDE 5; .6; .31; .03; .02 G/100ML; G/100ML; G/100ML; G/100ML; G/100ML
75 INJECTION, SOLUTION INTRAVENOUS CONTINUOUS
Status: DISCONTINUED | OUTPATIENT
Start: 2019-06-21 | End: 2019-06-22

## 2019-06-21 RX ORDER — LORAZEPAM 2 MG/ML
0.5 INJECTION INTRAMUSCULAR EVERY 4 HOURS PRN
Status: DISCONTINUED | OUTPATIENT
Start: 2019-06-21 | End: 2019-06-22

## 2019-06-21 RX ADMIN — CLOTRIMAZOLE 10 MG: 10 LOZENGE ORAL at 18:29

## 2019-06-21 RX ADMIN — CARVEDILOL 3.12 MG: 3.12 TABLET, FILM COATED ORAL at 18:29

## 2019-06-21 RX ADMIN — DIPHENHYDRAMINE HYDROCHLORIDE 30 ML: 12.5 SOLUTION ORAL at 12:52

## 2019-06-21 RX ADMIN — CARVEDILOL 3.12 MG: 3.12 TABLET, FILM COATED ORAL at 10:38

## 2019-06-21 RX ADMIN — CLOTRIMAZOLE 10 MG: 10 LOZENGE ORAL at 21:01

## 2019-06-21 RX ADMIN — TRIAMCINOLONE ACETONIDE: 1 CREAM TOPICAL at 20:59

## 2019-06-21 RX ADMIN — MORPHINE SULFATE 4 MG: 4 INJECTION INTRAVENOUS at 13:35

## 2019-06-21 RX ADMIN — METHYLPREDNISOLONE SODIUM SUCCINATE 60 MG: 125 INJECTION, POWDER, FOR SOLUTION INTRAMUSCULAR; INTRAVENOUS at 09:43

## 2019-06-21 RX ADMIN — MORPHINE SULFATE 4 MG: 4 INJECTION INTRAVENOUS at 21:00

## 2019-06-21 RX ADMIN — LATANOPROST 1 DROP: 50 SOLUTION OPHTHALMIC at 21:00

## 2019-06-21 RX ADMIN — SODIUM CHLORIDE, PRESERVATIVE FREE 3 ML: 5 INJECTION INTRAVENOUS at 09:44

## 2019-06-21 RX ADMIN — LORAZEPAM 0.5 MG: 2 INJECTION INTRAMUSCULAR; INTRAVENOUS at 20:59

## 2019-06-21 RX ADMIN — TRIAMCINOLONE ACETONIDE: 1 CREAM TOPICAL at 09:45

## 2019-06-21 RX ADMIN — METHYLPREDNISOLONE SODIUM SUCCINATE 40 MG: 40 INJECTION, POWDER, FOR SOLUTION INTRAMUSCULAR; INTRAVENOUS at 20:59

## 2019-06-21 RX ADMIN — LIDOCAINE HYDROCHLORIDE 10 ML: 20 SOLUTION ORAL; TOPICAL at 09:42

## 2019-06-21 RX ADMIN — MORPHINE SULFATE 4 MG: 4 INJECTION INTRAVENOUS at 00:37

## 2019-06-21 RX ADMIN — CLOTRIMAZOLE 10 MG: 10 LOZENGE ORAL at 10:39

## 2019-06-21 RX ADMIN — SODIUM CHLORIDE, PRESERVATIVE FREE 3 ML: 5 INJECTION INTRAVENOUS at 20:59

## 2019-06-21 RX ADMIN — MORPHINE SULFATE 4 MG: 4 INJECTION INTRAVENOUS at 03:37

## 2019-06-21 RX ADMIN — SODIUM CHLORIDE, SODIUM LACTATE, POTASSIUM CHLORIDE, CALCIUM CHLORIDE AND DEXTROSE MONOHYDRATE 75 ML/HR: 5; 600; 310; 30; 20 INJECTION, SOLUTION INTRAVENOUS at 10:38

## 2019-06-21 RX ADMIN — MORPHINE SULFATE 4 MG: 4 INJECTION INTRAVENOUS at 06:29

## 2019-06-21 RX ADMIN — LORAZEPAM 0.5 MG: 2 INJECTION INTRAMUSCULAR; INTRAVENOUS at 04:18

## 2019-06-21 RX ADMIN — LORAZEPAM 0.5 MG: 2 INJECTION INTRAMUSCULAR; INTRAVENOUS at 09:45

## 2019-06-21 RX ADMIN — SODIUM CHLORIDE, SODIUM LACTATE, POTASSIUM CHLORIDE, CALCIUM CHLORIDE AND DEXTROSE MONOHYDRATE 75 ML/HR: 5; 600; 310; 30; 20 INJECTION, SOLUTION INTRAVENOUS at 21:41

## 2019-06-21 RX ADMIN — ESTRADIOL 1 MG: 1 TABLET ORAL at 09:43

## 2019-06-21 RX ADMIN — Medication 1000 MCG: at 09:43

## 2019-06-22 PROCEDURE — 25010000002 MORPHINE PER 10 MG: Performed by: SPECIALIST

## 2019-06-22 PROCEDURE — 25010000002 LORAZEPAM PER 2 MG: Performed by: FAMILY MEDICINE

## 2019-06-22 PROCEDURE — 25010000002 MORPHINE PER 10 MG: Performed by: INTERNAL MEDICINE

## 2019-06-22 PROCEDURE — 25010000002 METHYLPREDNISOLONE PER 40 MG: Performed by: INTERNAL MEDICINE

## 2019-06-22 RX ORDER — MORPHINE SULFATE 2 MG/ML
2 INJECTION, SOLUTION INTRAMUSCULAR; INTRAVENOUS ONCE
Status: COMPLETED | OUTPATIENT
Start: 2019-06-22 | End: 2019-06-22

## 2019-06-22 RX ORDER — METHYLPREDNISOLONE SODIUM SUCCINATE 125 MG/2ML
INJECTION, POWDER, LYOPHILIZED, FOR SOLUTION INTRAMUSCULAR; INTRAVENOUS
Status: DISPENSED
Start: 2019-06-22 | End: 2019-06-23

## 2019-06-22 RX ORDER — ALPRAZOLAM 0.5 MG/1
1 TABLET ORAL 2 TIMES DAILY PRN
Status: DISCONTINUED | OUTPATIENT
Start: 2019-06-22 | End: 2019-06-24 | Stop reason: HOSPADM

## 2019-06-22 RX ORDER — PREDNISONE 20 MG/1
40 TABLET ORAL
Status: DISCONTINUED | OUTPATIENT
Start: 2019-06-22 | End: 2019-06-24 | Stop reason: HOSPADM

## 2019-06-22 RX ORDER — CHOLECALCIFEROL (VITAMIN D3) 125 MCG
1000 CAPSULE ORAL DAILY
Status: DISCONTINUED | OUTPATIENT
Start: 2019-06-23 | End: 2019-06-24 | Stop reason: HOSPADM

## 2019-06-22 RX ORDER — ESTRADIOL 1 MG/1
1 TABLET ORAL DAILY
Status: DISCONTINUED | OUTPATIENT
Start: 2019-06-23 | End: 2019-06-24 | Stop reason: HOSPADM

## 2019-06-22 RX ADMIN — LATANOPROST 1 DROP: 50 SOLUTION OPHTHALMIC at 21:22

## 2019-06-22 RX ADMIN — CARVEDILOL 3.12 MG: 3.12 TABLET, FILM COATED ORAL at 17:07

## 2019-06-22 RX ADMIN — Medication 1000 MCG: at 08:11

## 2019-06-22 RX ADMIN — MORPHINE SULFATE 2 MG: 2 INJECTION, SOLUTION INTRAMUSCULAR; INTRAVENOUS at 10:54

## 2019-06-22 RX ADMIN — CLOTRIMAZOLE 10 MG: 10 LOZENGE ORAL at 21:23

## 2019-06-22 RX ADMIN — HYDROCODONE BITARTRATE AND ACETAMINOPHEN 15 ML: 7.5; 325 SOLUTION ORAL at 12:22

## 2019-06-22 RX ADMIN — LORAZEPAM 0.5 MG: 2 INJECTION INTRAMUSCULAR; INTRAVENOUS at 05:35

## 2019-06-22 RX ADMIN — MORPHINE SULFATE 4 MG: 4 INJECTION INTRAVENOUS at 05:35

## 2019-06-22 RX ADMIN — TRIAMCINOLONE ACETONIDE: 1 CREAM TOPICAL at 21:23

## 2019-06-22 RX ADMIN — ALPRAZOLAM 1 MG: 0.5 TABLET ORAL at 21:22

## 2019-06-22 RX ADMIN — CLOTRIMAZOLE 10 MG: 10 LOZENGE ORAL at 17:07

## 2019-06-22 RX ADMIN — CLOTRIMAZOLE 10 MG: 10 LOZENGE ORAL at 08:10

## 2019-06-22 RX ADMIN — SODIUM CHLORIDE, PRESERVATIVE FREE 3 ML: 5 INJECTION INTRAVENOUS at 21:22

## 2019-06-22 RX ADMIN — SILVER SULFADIAZINE: 10 CREAM TOPICAL at 12:18

## 2019-06-22 RX ADMIN — HYDROCODONE BITARTRATE AND ACETAMINOPHEN 15 ML: 7.5; 325 SOLUTION ORAL at 16:13

## 2019-06-22 RX ADMIN — TRIAMCINOLONE ACETONIDE: 1 CREAM TOPICAL at 08:09

## 2019-06-22 RX ADMIN — HYDROCODONE BITARTRATE AND ACETAMINOPHEN 15 ML: 7.5; 325 SOLUTION ORAL at 21:22

## 2019-06-22 RX ADMIN — SILVER SULFADIAZINE: 10 CREAM TOPICAL at 21:23

## 2019-06-22 RX ADMIN — ESTRADIOL 1 MG: 1 TABLET ORAL at 08:11

## 2019-06-22 RX ADMIN — METHYLPREDNISOLONE SODIUM SUCCINATE 40 MG: 40 INJECTION, POWDER, FOR SOLUTION INTRAMUSCULAR; INTRAVENOUS at 08:09

## 2019-06-22 RX ADMIN — LANSOPRAZOLE 30 MG: KIT at 08:25

## 2019-06-22 RX ADMIN — CARVEDILOL 3.12 MG: 3.12 TABLET, FILM COATED ORAL at 08:12

## 2019-06-23 PROBLEM — S41.112A SKIN TEAR OF LEFT UPPER ARM WITHOUT COMPLICATION: Status: ACTIVE | Noted: 2019-06-23

## 2019-06-23 PROBLEM — Z93.1 G TUBE FEEDINGS (HCC): Status: ACTIVE | Noted: 2019-06-23

## 2019-06-23 LAB
ALBUMIN SERPL-MCNC: 2.8 G/DL (ref 3.5–5)
ALBUMIN/GLOB SERPL: 1.1 G/DL (ref 1.1–2.5)
ALP SERPL-CCNC: 78 U/L (ref 24–120)
ALT SERPL W P-5'-P-CCNC: 56 U/L (ref 0–54)
ANION GAP SERPL CALCULATED.3IONS-SCNC: 6 MMOL/L (ref 4–13)
AST SERPL-CCNC: 47 U/L (ref 7–45)
BILIRUB SERPL-MCNC: 0.3 MG/DL (ref 0.1–1)
BUN BLD-MCNC: 18 MG/DL (ref 5–21)
BUN/CREAT SERPL: 29.5 (ref 7–25)
CALCIUM SPEC-SCNC: 8.5 MG/DL (ref 8.4–10.4)
CHLORIDE SERPL-SCNC: 94 MMOL/L (ref 98–110)
CO2 SERPL-SCNC: 33 MMOL/L (ref 24–31)
CREAT BLD-MCNC: 0.61 MG/DL (ref 0.5–1.4)
DEPRECATED RDW RBC AUTO: 43.3 FL (ref 40–54)
ERYTHROCYTE [DISTWIDTH] IN BLOOD BY AUTOMATED COUNT: 12.5 % (ref 12–15)
GFR SERPL CREATININE-BSD FRML MDRD: 95 ML/MIN/1.73
GLOBULIN UR ELPH-MCNC: 2.5 GM/DL
GLUCOSE BLD-MCNC: 91 MG/DL (ref 70–100)
HCT VFR BLD AUTO: 30.3 % (ref 37–47)
HGB BLD-MCNC: 10 G/DL (ref 12–16)
MCH RBC QN AUTO: 31.8 PG (ref 28–32)
MCHC RBC AUTO-ENTMCNC: 33 G/DL (ref 33–36)
MCV RBC AUTO: 96.5 FL (ref 82–98)
PLATELET # BLD AUTO: 503 10*3/MM3 (ref 130–400)
PMV BLD AUTO: 8.7 FL (ref 6–12)
POTASSIUM BLD-SCNC: 3.8 MMOL/L (ref 3.5–5.3)
PROT SERPL-MCNC: 5.3 G/DL (ref 6.3–8.7)
RBC # BLD AUTO: 3.14 10*6/MM3 (ref 4.2–5.4)
SODIUM BLD-SCNC: 133 MMOL/L (ref 135–145)
WBC NRBC COR # BLD: 7.12 10*3/MM3 (ref 4.8–10.8)

## 2019-06-23 PROCEDURE — 63710000001 PREDNISONE PER 1 MG: Performed by: INTERNAL MEDICINE

## 2019-06-23 PROCEDURE — 94760 N-INVAS EAR/PLS OXIMETRY 1: CPT

## 2019-06-23 PROCEDURE — 80053 COMPREHEN METABOLIC PANEL: CPT | Performed by: SPECIALIST

## 2019-06-23 PROCEDURE — 94799 UNLISTED PULMONARY SVC/PX: CPT

## 2019-06-23 PROCEDURE — 85027 COMPLETE CBC AUTOMATED: CPT | Performed by: SPECIALIST

## 2019-06-23 PROCEDURE — 94618 PULMONARY STRESS TESTING: CPT

## 2019-06-23 RX ORDER — TRIAMCINOLONE ACETONIDE 1 MG/G
CREAM TOPICAL EVERY 12 HOURS SCHEDULED
Qty: 453.6 G | Refills: 2 | Status: SHIPPED | OUTPATIENT
Start: 2019-06-23

## 2019-06-23 RX ORDER — LANSOPRAZOLE
30 KIT EVERY MORNING
Qty: 300 ML | Refills: 2 | Status: SHIPPED | OUTPATIENT
Start: 2019-06-24 | End: 2020-07-07 | Stop reason: SDUPTHER

## 2019-06-23 RX ORDER — CLOTRIMAZOLE 10 MG/1
10 LOZENGE ORAL; TOPICAL
Qty: 35 TABLET | Refills: 0 | Status: SHIPPED | OUTPATIENT
Start: 2019-06-23 | End: 2019-06-30

## 2019-06-23 RX ORDER — CARVEDILOL 3.12 MG/1
3.12 TABLET ORAL 2 TIMES DAILY WITH MEALS
Qty: 60 TABLET | Refills: 0 | Status: SHIPPED | OUTPATIENT
Start: 2019-06-23 | End: 2019-06-24 | Stop reason: HOSPADM

## 2019-06-23 RX ORDER — PREDNISONE 20 MG/1
TABLET ORAL
Qty: 9 TABLET | Refills: 0 | Status: SHIPPED | OUTPATIENT
Start: 2019-06-24 | End: 2019-07-03

## 2019-06-23 RX ADMIN — CARVEDILOL 3.12 MG: 3.12 TABLET, FILM COATED ORAL at 16:33

## 2019-06-23 RX ADMIN — SILVER SULFADIAZINE: 10 CREAM TOPICAL at 11:44

## 2019-06-23 RX ADMIN — ALPRAZOLAM 1 MG: 0.5 TABLET ORAL at 08:18

## 2019-06-23 RX ADMIN — ALPRAZOLAM 1 MG: 0.5 TABLET ORAL at 21:46

## 2019-06-23 RX ADMIN — TRIAMCINOLONE ACETONIDE: 1 CREAM TOPICAL at 08:16

## 2019-06-23 RX ADMIN — LATANOPROST 1 DROP: 50 SOLUTION OPHTHALMIC at 21:20

## 2019-06-23 RX ADMIN — ESTRADIOL 1 MG: 1 TABLET ORAL at 08:19

## 2019-06-23 RX ADMIN — Medication 1000 MCG: at 08:19

## 2019-06-23 RX ADMIN — LANSOPRAZOLE 30 MG: KIT at 08:16

## 2019-06-23 RX ADMIN — CLOTRIMAZOLE 10 MG: 10 LOZENGE ORAL at 08:19

## 2019-06-23 RX ADMIN — CLOTRIMAZOLE 10 MG: 10 LOZENGE ORAL at 16:33

## 2019-06-23 RX ADMIN — HYDROCODONE BITARTRATE AND ACETAMINOPHEN 15 ML: 7.5; 325 SOLUTION ORAL at 08:17

## 2019-06-23 RX ADMIN — HYDROCODONE BITARTRATE AND ACETAMINOPHEN 15 ML: 7.5; 325 SOLUTION ORAL at 02:32

## 2019-06-23 RX ADMIN — CARVEDILOL 3.12 MG: 3.12 TABLET, FILM COATED ORAL at 08:19

## 2019-06-23 RX ADMIN — HYDROCODONE BITARTRATE AND ACETAMINOPHEN 15 ML: 7.5; 325 SOLUTION ORAL at 21:46

## 2019-06-23 RX ADMIN — PREDNISONE 40 MG: 20 TABLET ORAL at 08:20

## 2019-06-23 RX ADMIN — SODIUM CHLORIDE, PRESERVATIVE FREE 3 ML: 5 INJECTION INTRAVENOUS at 08:21

## 2019-06-23 RX ADMIN — SILVER SULFADIAZINE: 10 CREAM TOPICAL at 21:22

## 2019-06-23 RX ADMIN — DIPHENHYDRAMINE HYDROCHLORIDE 30 ML: 12.5 SOLUTION ORAL at 21:20

## 2019-06-23 RX ADMIN — HYDROCODONE BITARTRATE AND ACETAMINOPHEN 15 ML: 7.5; 325 SOLUTION ORAL at 16:34

## 2019-06-23 RX ADMIN — HYDROCODONE BITARTRATE AND ACETAMINOPHEN 15 ML: 7.5; 325 SOLUTION ORAL at 12:23

## 2019-06-23 RX ADMIN — CLOTRIMAZOLE 10 MG: 10 LOZENGE ORAL at 21:20

## 2019-06-24 VITALS
DIASTOLIC BLOOD PRESSURE: 42 MMHG | OXYGEN SATURATION: 100 % | TEMPERATURE: 98.1 F | WEIGHT: 94.31 LBS | HEIGHT: 63 IN | SYSTOLIC BLOOD PRESSURE: 91 MMHG | RESPIRATION RATE: 16 BRPM | BODY MASS INDEX: 16.71 KG/M2 | HEART RATE: 96 BPM

## 2019-06-24 PROBLEM — R50.2 DRUG INDUCED FEVER: Status: ACTIVE | Noted: 2019-06-24

## 2019-06-24 PROCEDURE — 63710000001 PREDNISONE PER 1 MG: Performed by: INTERNAL MEDICINE

## 2019-06-24 RX ADMIN — ESTRADIOL 1 MG: 1 TABLET ORAL at 09:11

## 2019-06-24 RX ADMIN — SILVER SULFADIAZINE: 10 CREAM TOPICAL at 13:12

## 2019-06-24 RX ADMIN — PREDNISONE 40 MG: 20 TABLET ORAL at 09:11

## 2019-06-24 RX ADMIN — Medication 1000 MCG: at 09:11

## 2019-06-24 RX ADMIN — CLOTRIMAZOLE 10 MG: 10 LOZENGE ORAL at 13:11

## 2019-06-24 RX ADMIN — CLOTRIMAZOLE 10 MG: 10 LOZENGE ORAL at 06:25

## 2019-06-24 RX ADMIN — SILVER SULFADIAZINE: 10 CREAM TOPICAL at 06:25

## 2019-06-24 RX ADMIN — CARVEDILOL 3.12 MG: 3.12 TABLET, FILM COATED ORAL at 09:11

## 2019-06-24 RX ADMIN — LANSOPRAZOLE 30 MG: KIT at 06:25

## 2019-06-24 RX ADMIN — DIPHENHYDRAMINE HYDROCHLORIDE 30 ML: 12.5 SOLUTION ORAL at 09:11

## 2019-06-25 ENCOUNTER — READMISSION MANAGEMENT (OUTPATIENT)
Dept: CALL CENTER | Facility: HOSPITAL | Age: 79
End: 2019-06-25

## 2019-06-25 NOTE — OUTREACH NOTE
Prep Survey      Responses   Facility patient discharged from?  [place]   Is patient eligible?  Yes   Discharge diagnosis  N/V,  G-tube feeds,     Does the patient have one of the following disease processes/diagnoses(primary or secondary)?  Other   Does the patient have Home health ordered?  Yes   What is the Home health agency?   [facility]   Is there a DME ordered?  Yes   What DME was ordered?  infusion therapy - [facility]   Comments regarding appointments  see AVS   Prep survey completed?  Yes          Rosana Masters RN

## 2019-06-27 ENCOUNTER — READMISSION MANAGEMENT (OUTPATIENT)
Dept: CALL CENTER | Facility: HOSPITAL | Age: 79
End: 2019-06-27

## 2019-06-27 NOTE — OUTREACH NOTE
Medical Week 1 Survey      Responses   Facility patient discharged from?  [place]   Does the patient have one of the following disease processes/diagnoses(primary or secondary)?  Other   Is there a successful TCM telephone encounter documented?  No   Week 1 attempt successful?  No   Unsuccessful attempts  Attempt 1          Yanely Duggan, RN

## 2019-06-28 ENCOUNTER — READMISSION MANAGEMENT (OUTPATIENT)
Dept: CALL CENTER | Facility: HOSPITAL | Age: 79
End: 2019-06-28

## 2019-06-28 NOTE — OUTREACH NOTE
Medical Week 1 Survey      Responses   Facility patient discharged from?  [place]   Does the patient have one of the following disease processes/diagnoses(primary or secondary)?  Other   Is there a successful TCM telephone encounter documented?  No   Week 1 attempt successful?  Yes   Call start time  1810   Call end time  1817   Discharge diagnosis  N/V,  G-tube feeds,     Meds reviewed with patient/caregiver?  Yes   Is the patient having any side effects they believe may be caused by any medication additions or changes?  No   Does the patient have all medications ordered at discharge?  Yes   Is the patient taking all medications as directed (includes completed medication regime)?  Yes   Does the patient have a primary care provider?   Yes   Does the patient have an appointment with their PCP within 7 days of discharge?  Yes   Has the patient kept scheduled appointments due by today?  N/A   What is the Home health agency?   [facility]   Has home health visited the patient within 72 hours of discharge?  Yes   What DME was ordered?  infusion therapy - [facility]   Has all DME been delivered?  Yes   Did the patient receive a copy of their discharge instructions?  Yes   Nursing interventions  Reviewed instructions with patient   What is the patient's perception of their health status since discharge?  Improving   Is the patient/caregiver able to teach back signs and symptoms related to disease process for when to call PCP?  Yes   Is the patient/caregiver able to teach back signs and symptoms related to disease process for when to call 911?  Yes   Additional teach back comments  Says wound is improving.  She has a f/u appt on Monday.   Week 1 call completed?  Yes          Zulay Pratt RN

## 2019-07-05 ENCOUNTER — READMISSION MANAGEMENT (OUTPATIENT)
Dept: CALL CENTER | Facility: HOSPITAL | Age: 79
End: 2019-07-05

## 2019-07-05 NOTE — OUTREACH NOTE
Medical Week 2 Survey      Responses   Facility patient discharged from?  [place]   Does the patient have one of the following disease processes/diagnoses(primary or secondary)?  Other   Week 2 attempt successful?  Yes   Call start time  1811   Call end time  1813   Meds reviewed with patient/caregiver?  Yes   Is the patient taking all medications as directed (includes completed medication regime)?  Yes   Has the patient kept scheduled appointments due by today?  Yes   Psychosocial issues?  No   Comments  uses walker an doxygen as needed with cpap and HH comes   What is the patient's perception of their health status since discharge?  Improving   Week 2 Call Completed?  Yes   Wrap up additional comments  doing better, wound looking better, has been to [place]          Yanely Duggan RN

## 2019-07-08 ENCOUNTER — TELEPHONE (OUTPATIENT)
Dept: GASTROENTEROLOGY | Facility: CLINIC | Age: 79
End: 2019-07-08

## 2019-07-08 NOTE — TELEPHONE ENCOUNTER
Patients daughter Caitlyn called as instructed by Neela to update us after their appointment with Dr. Carlson. She states that he is going to start serial dilations on 7/23/19. She said if we needed to discuss anything further we can call her back at [phone] if not they will update us again after the procedure.

## 2019-07-12 ENCOUNTER — READMISSION MANAGEMENT (OUTPATIENT)
Dept: CALL CENTER | Facility: HOSPITAL | Age: 79
End: 2019-07-12

## 2019-07-12 NOTE — OUTREACH NOTE
Medical Week 3 Survey      Responses   Facility patient discharged from?  [place]   Does the patient have one of the following disease processes/diagnoses(primary or secondary)?  Other   Week 3 attempt successful?  Yes   Call start time  1338   Call end time  1350   Discharge diagnosis  N/V,  G-tube feeds,     Meds reviewed with patient/caregiver?  Yes   Is the patient having any side effects they believe may be caused by any medication additions or changes?  No   Does the patient have all medications ordered at discharge?  Yes   Is the patient taking all medications as directed (includes completed medication regime)?  Yes   Does the patient have a primary care provider?   Yes   Comments regarding PCP  Will see PCP on 7/20   Does the patient have an appointment with their PCP within 7 days of discharge?  Greater than 7 days   Nursing Interventions  Verified appointment date/time/provider   Has the patient kept scheduled appointments due by today?  No   Comments  Trying to reschedule with rheumatologist but the dates they have available she already has a conflict. Has seen Dr. White   What is the Home health agency?   [facility]   Home health comments  PT still coming but has not seen nurse this week, instructed to call and find out status of that, VU.   What DME was ordered?  infusion therapy - [facility]   Psychosocial issues?  No   Nursing interventions  Educated on MyChart   What is the patient's perception of their health status since discharge?  Improving   Additional teach back comments  Still having wound care to arm and she changes it and the g tube site. all looks well, gaining weight and tolerating feedings   Week 3 Call Completed?  Yes          Bia Sauer RN

## 2019-07-18 ENCOUNTER — HOSPITAL ENCOUNTER (OUTPATIENT)
Dept: ULTRASOUND IMAGING | Facility: HOSPITAL | Age: 79
Discharge: HOME | End: 2019-07-18

## 2019-07-18 ENCOUNTER — HOSPITAL ENCOUNTER (OUTPATIENT)
Dept: GENERAL RADIOLOGY | Facility: HOSPITAL | Age: 79
Discharge: HOME | End: 2019-07-18
Admitting: NURSE PRACTITIONER

## 2019-07-18 ENCOUNTER — TRANSCRIBE ORDERS (OUTPATIENT)
Dept: ADMINISTRATIVE | Facility: HOSPITAL | Age: 79
End: 2019-07-18

## 2019-07-18 ENCOUNTER — LAB (OUTPATIENT)
Dept: LAB | Facility: HOSPITAL | Age: 79
End: 2019-07-18

## 2019-07-18 DIAGNOSIS — N18.30 CHRONIC KIDNEY DISEASE, STAGE III (MODERATE) (HCC): ICD-10-CM

## 2019-07-18 DIAGNOSIS — M79.604 RIGHT LEG PAIN: ICD-10-CM

## 2019-07-18 DIAGNOSIS — M79.605 LEFT LEG PAIN: ICD-10-CM

## 2019-07-18 DIAGNOSIS — R06.02 SHORTNESS OF BREATH: ICD-10-CM

## 2019-07-18 DIAGNOSIS — R60.9 EDEMA, UNSPECIFIED TYPE: ICD-10-CM

## 2019-07-18 DIAGNOSIS — M79.604 RIGHT LEG PAIN: Primary | ICD-10-CM

## 2019-07-18 DIAGNOSIS — R60.9 EDEMA, UNSPECIFIED TYPE: Primary | ICD-10-CM

## 2019-07-18 LAB
ALBUMIN SERPL-MCNC: 4 G/DL (ref 3.5–5)
ALBUMIN/GLOB SERPL: 1.4 G/DL (ref 1.1–2.5)
ALP SERPL-CCNC: 107 U/L (ref 24–120)
ALT SERPL W P-5'-P-CCNC: 26 U/L (ref 0–54)
ANION GAP SERPL CALCULATED.3IONS-SCNC: 7 MMOL/L (ref 4–13)
AST SERPL-CCNC: 33 U/L (ref 7–45)
AUTO MIXED CELLS #: 0.5 10*3/MM3 (ref 0.1–2.6)
AUTO MIXED CELLS %: 7.6 % (ref 0.1–24)
BILIRUB SERPL-MCNC: 0.2 MG/DL (ref 0.1–1)
BUN BLD-MCNC: 36 MG/DL (ref 5–21)
BUN/CREAT SERPL: 48
CALCIUM SPEC-SCNC: 9.7 MG/DL (ref 8.4–10.4)
CHLORIDE SERPL-SCNC: 96 MMOL/L (ref 98–110)
CO2 SERPL-SCNC: 34 MMOL/L (ref 24–31)
CREAT BLD-MCNC: 0.75 MG/DL (ref 0.5–1.4)
ERYTHROCYTE [DISTWIDTH] IN BLOOD BY AUTOMATED COUNT: 12.6 % (ref 12–15)
GFR SERPL CREATININE-BSD FRML MDRD: 75 ML/MIN/1.73
GLOBULIN UR ELPH-MCNC: 2.9 GM/DL
GLUCOSE BLD-MCNC: 95 MG/DL (ref 70–100)
HCT VFR BLD AUTO: 31.2 % (ref 37–47)
HGB BLD-MCNC: 9.9 G/DL (ref 12–16)
LYMPHOCYTES # BLD AUTO: 1.7 10*3/MM3 (ref 0.7–3.1)
LYMPHOCYTES NFR BLD AUTO: 23.7 % (ref 15–45)
MCH RBC QN AUTO: 31.2 PG (ref 28–32)
MCHC RBC AUTO-ENTMCNC: 31.7 G/DL (ref 33–36)
MCV RBC AUTO: 98.4 FL (ref 82–98)
NEUTROPHILS # BLD AUTO: 4.9 10*3/MM3 (ref 1.5–8.3)
NEUTROPHILS NFR BLD AUTO: 68.7 % (ref 39–78)
PLATELET # BLD AUTO: 225 10*3/MM3 (ref 130–400)
PMV BLD AUTO: 8.7 FL (ref 6–12)
POTASSIUM BLD-SCNC: 4.4 MMOL/L (ref 3.5–5.3)
PROT SERPL-MCNC: 6.9 G/DL (ref 6.3–8.7)
RBC # BLD AUTO: 3.17 10*6/MM3 (ref 4.2–5.4)
SODIUM BLD-SCNC: 137 MMOL/L (ref 135–145)
WBC NRBC COR # BLD: 7.1 10*3/MM3 (ref 4.8–10.8)

## 2019-07-18 PROCEDURE — 84443 ASSAY THYROID STIM HORMONE: CPT | Performed by: NURSE PRACTITIONER

## 2019-07-18 PROCEDURE — 84439 ASSAY OF FREE THYROXINE: CPT | Performed by: NURSE PRACTITIONER

## 2019-07-18 PROCEDURE — 80053 COMPREHEN METABOLIC PANEL: CPT | Performed by: NURSE PRACTITIONER

## 2019-07-18 PROCEDURE — 85025 COMPLETE CBC W/AUTO DIFF WBC: CPT | Performed by: NURSE PRACTITIONER

## 2019-07-18 PROCEDURE — 71046 X-RAY EXAM CHEST 2 VIEWS: CPT

## 2019-07-18 PROCEDURE — 93970 EXTREMITY STUDY: CPT

## 2019-07-18 PROCEDURE — 36415 COLL VENOUS BLD VENIPUNCTURE: CPT | Performed by: NURSE PRACTITIONER

## 2019-07-19 ENCOUNTER — TRANSCRIBE ORDERS (OUTPATIENT)
Dept: ADMINISTRATIVE | Facility: HOSPITAL | Age: 79
End: 2019-07-19

## 2019-07-19 ENCOUNTER — READMISSION MANAGEMENT (OUTPATIENT)
Dept: CALL CENTER | Facility: HOSPITAL | Age: 79
End: 2019-07-19

## 2019-07-19 ENCOUNTER — APPOINTMENT (OUTPATIENT)
Dept: GENERAL RADIOLOGY | Facility: HOSPITAL | Age: 79
End: 2019-07-19

## 2019-07-19 ENCOUNTER — HOSPITAL ENCOUNTER (OUTPATIENT)
Dept: CARDIOLOGY | Facility: HOSPITAL | Age: 79
Discharge: HOME | End: 2019-07-19
Admitting: NURSE PRACTITIONER

## 2019-07-19 DIAGNOSIS — N18.30 CHRONIC KIDNEY DISEASE, STAGE III (MODERATE) (HCC): ICD-10-CM

## 2019-07-19 DIAGNOSIS — R60.9 EDEMA, UNSPECIFIED TYPE: Primary | ICD-10-CM

## 2019-07-19 LAB
T4 FREE SERPL-MCNC: 1.15 NG/DL (ref 0.78–2.19)
TSH SERPL DL<=0.05 MIU/L-ACNC: 1.87 MIU/ML (ref 0.47–4.68)

## 2019-07-19 PROCEDURE — 93010 ELECTROCARDIOGRAM REPORT: CPT | Performed by: INTERNAL MEDICINE

## 2019-07-19 PROCEDURE — 93005 ELECTROCARDIOGRAM TRACING: CPT

## 2019-07-19 NOTE — OUTREACH NOTE
Medical Week 4 Survey      Responses   Facility patient discharged from?  [place]   Does the patient have one of the following disease processes/diagnoses(primary or secondary)?  Other   Week 4 attempt successful?  No          Rosa Castle RN

## 2019-08-22 ENCOUNTER — OFFICE VISIT (OUTPATIENT)
Dept: GASTROENTEROLOGY | Facility: CLINIC | Age: 79
End: 2019-08-22

## 2019-08-22 VITALS
WEIGHT: 99.2 LBS | HEART RATE: 81 BPM | BODY MASS INDEX: 16.94 KG/M2 | HEIGHT: 64 IN | OXYGEN SATURATION: 100 % | SYSTOLIC BLOOD PRESSURE: 122 MMHG | DIASTOLIC BLOOD PRESSURE: 62 MMHG

## 2019-08-22 DIAGNOSIS — L43.9 LICHEN PLANUS: ICD-10-CM

## 2019-08-22 DIAGNOSIS — Z93.1 G TUBE FEEDINGS (HCC): ICD-10-CM

## 2019-08-22 DIAGNOSIS — R13.19 ESOPHAGEAL DYSPHAGIA: Primary | ICD-10-CM

## 2019-08-22 PROCEDURE — 99213 OFFICE O/P EST LOW 20 MIN: CPT | Performed by: INTERNAL MEDICINE

## 2019-08-22 NOTE — PROGRESS NOTES
[facility] Gastroenterology - Office note  8/22/2019    Olive Bowman 1940     Chief Complaint   Patient presents with   • GI Problem     Discuss problems swallowing       HISTORY OF PRESENT ILLNESS    Olive Bowman is a 78 y.o. female who presents for follow-up.  Lichen planus.  Was admitted back a few months ago with dysphagia.  I felt she had a narrow esophagus.  Eventually referred her to [facility] where she underwent endoscopy and dilatation.  Since that time she has only been eating very little.  A G-tube was placed during her hospitalization and she been maintained on supplements.  Weight is up to 100 pounds.  Eating some soft food still with some pain although not as severe as in the past.    Past Medical History:   Diagnosis Date   • Arthralgia    • Dyspepsia 9/5/2018    she is stable at this time. she is to take Gaviscon as needed and Omeprazole if persistent for more than a few weeks.    • Fibromyalgia    • Lichen planus    • Mouth ulcers 11/18/2016   • Osteoporosis    • Primary osteoarthritis of both hands    • Sicca (CMS/HCC)    • Ulcerative colitis (CMS/HCC)    • Vitamin D deficiency    • Xerostomia 11/19/2016       Past Surgical History:   Procedure Laterality Date   • BREAST EXCISIONAL BIOPSY Right     x 2 benign   • BREAST EXCISIONAL BIOPSY Left     x 1 benign   • BREAST LUMPECTOMY     • CHOLECYSTECTOMY     • COLONOSCOPY W/ BIOPSIES  05/22/2013    Changes consistent with collagenous colitis   • ENDOSCOPY  02/04/2015    Dilated normal exam   • ENDOSCOPY N/A 6/7/2019    Procedure: ESOPHAGOGASTRODUODENOSCOPY WITH ANESTHESIA;  Surgeon: Kun Cavanaugh MD;  Location: [facility] ENDOSCOPY;  Service: Gastroenterology   • GASTROSTOMY FEEDING TUBE INSERTION N/A 6/20/2019    Procedure: GASTROSTOMY FEEDING TUBE INSERTION;  Surgeon: Doreen White MD;  Location: [facility] OR;  Service: General   • HYSTERECTOMY           Current Outpatient Medications:   •  ALPRAZolam (XANAX) 1 MG tablet, Take 1 mg  by mouth 2 (Two) Times a Day As Needed for Anxiety., Disp: , Rfl:   •  aluminum hydroxide-magnesium carbonate (GAVISCON)  MG/15ML suspension oral suspension, Take 15 mL by mouth Every Night. Mix with 10ml lidocaine viscus, Disp: , Rfl:   •  Cholecalciferol (VITAMIN D3) 5000 units capsule capsule, Take 5,000 Units by mouth 4 (Four) Times a Week. Sunday, Monday, Wednesday, Friday., Disp: , Rfl:   •  estradiol (ESTRACE) 1 MG tablet, Take 1 mg by mouth Daily., Disp: , Rfl:   •  HYDROcodone-acetaminophen (NORCO)  MG per tablet, Take 1 tablet by mouth Every 6 (Six) Hours As Needed for Moderate Pain ., Disp: , Rfl:   •  lansoprazole (FIRST) 3 MG/ML suspension oral suspension, 10 mL by Nasogastric route Every Morning., Disp: 300 mL, Rfl: 2  •  latanoprost (XALATAN) 0.005 % ophthalmic solution, Administer 1 drop to both eyes Every Night., Disp: , Rfl:   •  lidocaine viscous (XYLOCAINE) 2 % solution, Take 10 mL by mouth Every Night. Mix with Gaviscon., Disp: , Rfl:   •  Miracle mouthwash oral suspension, Swish and spit 15 mL 3 (Three) Times a Day As Needed (mouth sores)., Disp: , Rfl:   •  orphenadrine (NORFLEX) 100 MG 12 hr tablet, Take 100 mg by mouth 2 (Two) Times a Day., Disp: , Rfl:   •  Probiotic Product (PROBIOTIC PO), Take 1 capsule by mouth Daily., Disp: , Rfl:   •  silver sulfadiazine (SILVADENE, SSD) 1 % cream, Apply  topically to the appropriate area as directed 2 (Two) Times a Day., Disp: 50 g, Rfl: 2  •  triamcinolone (KENALOG) 0.1 % cream, Apply  topically to the appropriate area as directed Every 12 (Twelve) Hours. For Lichen planus, Disp: 453.6 g, Rfl: 2  •  vitamin B-12 (CYANOCOBALAMIN) 500 MCG tablet, Take 1,000 mcg by mouth Daily., Disp: , Rfl:   •  vitamin E 1000 UNIT capsule, Take 1,000 Units by mouth Daily., Disp: , Rfl:     Allergies   Allergen Reactions   • Azathioprine Other (See Comments)     High fever, rash, oral lesions   • Latex        Social History     Socioeconomic History   •  "Marital status:      Spouse name: Not on file   • Number of children: Not on file   • Years of education: Not on file   • Highest education level: Not on file   Tobacco Use   • Smoking status: Former Smoker     Last attempt to quit:      Years since quittin.6   • Smokeless tobacco: Never Used   Substance and Sexual Activity   • Alcohol use: Yes     Alcohol/week: 2.4 oz     Types: 4 Glasses of wine per week   • Drug use: Defer   • Sexual activity: Defer       Family History   Problem Relation Age of Onset   • Cancer Mother    • Diabetes Paternal Grandmother    • Diabetes Paternal Grandfather    • Breast cancer Neg Hx    • Colon cancer Neg Hx    • Colon polyps Neg Hx        Review of Systems   Respiratory: Negative.    Cardiovascular: Negative.    Gastrointestinal: Negative.        Vitals:    19 1342   BP: 122/62   Pulse: 81   SpO2: 100%   Weight: 45 kg (99 lb 3.2 oz)   Height: 162.6 cm (64\")    Body mass index is 17.03 kg/m².    Physical Exam   Abdominal:   G-tube in good position without significant drainage       Lab Results - Last 18 Months   Lab Units 19  1756  19  0514  19  0440   WBC 10*3/mm3 7.10   < > 5.15  --  6.94   HEMOGLOBIN g/dL 9.9*   < > 9.8*  --  10.0*   HEMATOCRIT % 31.2*   < > 30.0*  --  31.0*   MCV fL 98.4*   < > 98.0  --  99.7*   PLATELETS 10*3/mm3 225   < > 317  --  182   GLUCOSE mg/dL 95   < > 81   < > 112*   BUN mg/dL 36*   < > 13   < > 16   CREATININE mg/dL 0.75   < > 0.78   < > 0.60   SODIUM mmol/L 137   < > 137   < > 133*   POTASSIUM mmol/L 4.4   < > 4.0   < > 4.0   CHLORIDE mmol/L 96*   < > 98   < > 99   CO2 mmol/L 34.0*   < > 35.0*   < > 31.0   TOTAL PROTEIN g/dL 6.9   < > 5.1*   < > 4.4*   ALBUMIN g/dL 4.00   < > 2.50*   < > 2.20*   ALT (SGPT) U/L 26   < > 61*   < > 73*   AST (SGOT) U/L 33   < > 40   < > 26   ALK PHOS U/L 107   < > 92   < > 125*   BILIRUBIN mg/dL 0.2   < > 0.2   < > 0.2   GLOBULIN gm/dL 2.9   < > 2.6   < > 2.2   CRP mg/dL  --   --  " 3.74*  --  8.73*   SED RATE mm/hr  --   --   --   --  3    < > = values in this interval not displayed.         ASSESSMENT AND PLAN      1. Esophageal dysphagia  Difficult to know if she is having problems given the fact that she is only eating soft food.  She states she did not understand if she should eat anything or not.  I have advised her to start eating soft foods and see how she tolerates these    2. G tube feedings (CMS/[facility])  I would continue G-tube supplements until we see if she can maintain her oral intake and adequate calories.  I will see her back in 6 weeks or so and if she is maintaining and gaining weight we will try to decrease the number of feeds    3. Lichen planus  Per others       EMR Dragon/transcription disclaimer: Much of this encounter note is an electronic transcription/translation of spoken language to printed text.  The electronic translation of spoken language may permit erroneous, or at times, nonsensical words or phrases to be inadvertently transcribed.  Although I have reviewed the note for such errors, some may still exist.    Kun Cavanaugh MD  8/22/2019  2:28 PM

## 2019-10-15 ENCOUNTER — OFFICE VISIT (OUTPATIENT)
Dept: GASTROENTEROLOGY | Facility: CLINIC | Age: 79
End: 2019-10-15

## 2019-10-15 VITALS
BODY MASS INDEX: 17.93 KG/M2 | WEIGHT: 105 LBS | HEIGHT: 64 IN | HEART RATE: 84 BPM | OXYGEN SATURATION: 94 % | DIASTOLIC BLOOD PRESSURE: 68 MMHG | SYSTOLIC BLOOD PRESSURE: 120 MMHG

## 2019-10-15 DIAGNOSIS — R13.19 ESOPHAGEAL DYSPHAGIA: Primary | ICD-10-CM

## 2019-10-15 PROCEDURE — 99213 OFFICE O/P EST LOW 20 MIN: CPT | Performed by: INTERNAL MEDICINE

## 2019-10-15 NOTE — PROGRESS NOTES
[facility] Gastroenterology - Office note  10/15/2019    Olive Bowman 1940     Chief Complaint   Patient presents with   • GI Problem     Here to discuss swallowing       HISTORY OF PRESENT ILLNESS    Olive Bowman is a 78 y.o. female who presents for follow-up.  Weight has increased.  She still using 5 cans of supplement but she starting to eat more.  Only complaint is the G-tube.    Past Medical History:   Diagnosis Date   • Arthralgia    • Dyspepsia 9/5/2018    she is stable at this time. she is to take Gaviscon as needed and Omeprazole if persistent for more than a few weeks.    • Fibromyalgia    • Lichen planus    • Mouth ulcers 11/18/2016   • Osteoporosis    • Primary osteoarthritis of both hands    • Sicca (CMS/HCC)    • Ulcerative colitis (CMS/HCC)    • Vitamin D deficiency    • Xerostomia 11/19/2016       Past Surgical History:   Procedure Laterality Date   • BREAST EXCISIONAL BIOPSY Right     x 2 benign   • BREAST EXCISIONAL BIOPSY Left     x 1 benign   • BREAST LUMPECTOMY     • CHOLECYSTECTOMY     • COLONOSCOPY W/ BIOPSIES  05/22/2013    Changes consistent with collagenous colitis   • ENDOSCOPY  02/04/2015    Dilated normal exam   • ENDOSCOPY N/A 6/7/2019    Procedure: ESOPHAGOGASTRODUODENOSCOPY WITH ANESTHESIA;  Surgeon: Kun Cavanaugh MD;  Location: [facility] ENDOSCOPY;  Service: Gastroenterology   • GASTROSTOMY FEEDING TUBE INSERTION N/A 6/20/2019    Procedure: GASTROSTOMY FEEDING TUBE INSERTION;  Surgeon: Doreen White MD;  Location: [facility] OR;  Service: General   • HYSTERECTOMY           Current Outpatient Medications:   •  ALPRAZolam (XANAX) 1 MG tablet, Take 1 mg by mouth 2 (Two) Times a Day As Needed for Anxiety., Disp: , Rfl:   •  aluminum hydroxide-magnesium carbonate (GAVISCON)  MG/15ML suspension oral suspension, Take 15 mL by mouth Every Night. Mix with 10ml lidocaine viscus, Disp: , Rfl:   •  Cholecalciferol (VITAMIN D3) 5000 units capsule capsule, Take 5,000 Units  by mouth 4 (Four) Times a Week. , Monday, Wednesday, Friday., Disp: , Rfl:   •  estradiol (ESTRACE) 1 MG tablet, Take 1 mg by mouth Daily., Disp: , Rfl:   •  HYDROcodone-acetaminophen (NORCO)  MG per tablet, Take 1 tablet by mouth Every 6 (Six) Hours As Needed for Moderate Pain ., Disp: , Rfl:   •  lansoprazole (FIRST) 3 MG/ML suspension oral suspension, 10 mL by Nasogastric route Every Morning., Disp: 300 mL, Rfl: 2  •  latanoprost (XALATAN) 0.005 % ophthalmic solution, Administer 1 drop to both eyes Every Night., Disp: , Rfl:   •  lidocaine viscous (XYLOCAINE) 2 % solution, Take 10 mL by mouth Every Night. Mix with Gaviscon., Disp: , Rfl:   •  Miracle mouthwash oral suspension, Swish and spit 15 mL 3 (Three) Times a Day As Needed (mouth sores)., Disp: , Rfl:   •  orphenadrine (NORFLEX) 100 MG 12 hr tablet, Take 100 mg by mouth 2 (Two) Times a Day., Disp: , Rfl:   •  Probiotic Product (PROBIOTIC PO), Take 1 capsule by mouth Daily., Disp: , Rfl:   •  silver sulfadiazine (SILVADENE, SSD) 1 % cream, Apply  topically to the appropriate area as directed 2 (Two) Times a Day., Disp: 50 g, Rfl: 2  •  triamcinolone (KENALOG) 0.1 % cream, Apply  topically to the appropriate area as directed Every 12 (Twelve) Hours. For Lichen planus, Disp: 453.6 g, Rfl: 2  •  vitamin B-12 (CYANOCOBALAMIN) 500 MCG tablet, Take 1,000 mcg by mouth Daily., Disp: , Rfl:   •  vitamin E 1000 UNIT capsule, Take 1,000 Units by mouth Daily., Disp: , Rfl:     Allergies   Allergen Reactions   • Azathioprine Other (See Comments)     High fever, rash, oral lesions   • Latex        Social History     Socioeconomic History   • Marital status:      Spouse name: Not on file   • Number of children: Not on file   • Years of education: Not on file   • Highest education level: Not on file   Tobacco Use   • Smoking status: Former Smoker     Last attempt to quit:      Years since quittin.8   • Smokeless tobacco: Never Used   Substance  "and Sexual Activity   • Alcohol use: Yes     Alcohol/week: 2.4 oz     Types: 4 Glasses of wine per week   • Drug use: Defer   • Sexual activity: Defer       Family History   Problem Relation Age of Onset   • Cancer Mother    • Diabetes Paternal Grandmother    • Diabetes Paternal Grandfather    • Breast cancer Neg Hx    • Colon cancer Neg Hx    • Colon polyps Neg Hx        Review of Systems   HENT: Positive for trouble swallowing.    Respiratory: Negative.    Cardiovascular: Negative.    Gastrointestinal: Negative.    Musculoskeletal: Negative.        Vitals:    10/15/19 1337   BP: 120/68   Pulse: 84   SpO2: 94%   Weight: 47.6 kg (105 lb)   Height: 162.6 cm (64\")    Body mass index is 18.02 kg/m².    Physical Exam   Cardiovascular: Normal rate.   Pulmonary/Chest: Effort normal.   Abdominal: Soft. Bowel sounds are normal.   G-tube   Neurological: She is alert.   Skin: Skin is warm.   Psychiatric: She has a normal mood and affect.       Lab Results - Last 18 Months   Lab Units 07/18/19  1756  06/19/19  0514  06/17/19  0440   WBC 10*3/mm3 7.10   < > 5.15  --  6.94   HEMOGLOBIN g/dL 9.9*   < > 9.8*  --  10.0*   HEMATOCRIT % 31.2*   < > 30.0*  --  31.0*   MCV fL 98.4*   < > 98.0  --  99.7*   PLATELETS 10*3/mm3 225   < > 317  --  182   GLUCOSE mg/dL 95   < > 81   < > 112*   BUN mg/dL 36*   < > 13   < > 16   CREATININE mg/dL 0.75   < > 0.78   < > 0.60   SODIUM mmol/L 137   < > 137   < > 133*   POTASSIUM mmol/L 4.4   < > 4.0   < > 4.0   CHLORIDE mmol/L 96*   < > 98   < > 99   CO2 mmol/L 34.0*   < > 35.0*   < > 31.0   TOTAL PROTEIN g/dL 6.9   < > 5.1*   < > 4.4*   ALBUMIN g/dL 4.00   < > 2.50*   < > 2.20*   ALT (SGPT) U/L 26   < > 61*   < > 73*   AST (SGOT) U/L 33   < > 40   < > 26   ALK PHOS U/L 107   < > 92   < > 125*   BILIRUBIN mg/dL 0.2   < > 0.2   < > 0.2   GLOBULIN gm/dL 2.9   < > 2.6   < > 2.2   CRP mg/dL  --   --  3.74*  --  8.73*   SED RATE mm/hr  --   --   --   --  3    < > = values in this interval not displayed. "         ASSESSMENT AND PLAN      1. Esophageal dysphagia  Continue complaints of dysphasia despite being dilated in [place] to 52 [place].  She is trying to expand her diet was soft foods and is going to try some chopped meats.  I am still not certain this is esophageal dysphagia.  Her weight is increased to 105 pounds.  Weight is increased over 10 pounds since all of this began.  I told her to cut her supplements back to 3 cans a day and to try to eat more if she maintains her weight will consider taking out the G-tube in a few weeks.  I will see her back in 4 weeks we will recheck weights at that time see if she can drink supplements rather than use the tube and then hopefully get it out the first part of December       EMR Dragon/transcription disclaimer: Much of this encounter note is an electronic transcription/translation of spoken language to printed text.  The electronic translation of spoken language may permit erroneous, or at times, nonsensical words or phrases to be inadvertently transcribed.  Although I have reviewed the note for such errors, some may still exist.    Kun Cavanaugh MD  10/15/2019  2:04 PM

## 2019-11-11 ENCOUNTER — OFFICE VISIT (OUTPATIENT)
Dept: GASTROENTEROLOGY | Facility: CLINIC | Age: 79
End: 2019-11-11

## 2019-11-11 VITALS
OXYGEN SATURATION: 97 % | HEART RATE: 68 BPM | BODY MASS INDEX: 18.44 KG/M2 | SYSTOLIC BLOOD PRESSURE: 122 MMHG | WEIGHT: 108 LBS | HEIGHT: 64 IN | DIASTOLIC BLOOD PRESSURE: 62 MMHG

## 2019-11-11 DIAGNOSIS — R11.2 NON-INTRACTABLE VOMITING WITH NAUSEA: Primary | ICD-10-CM

## 2019-11-11 PROCEDURE — 99213 OFFICE O/P EST LOW 20 MIN: CPT | Performed by: INTERNAL MEDICINE

## 2019-11-11 NOTE — PROGRESS NOTES
[facility] Gastroenterology - Office note  11/11/2019    Olive Bowman 1940     Chief Complaint   Patient presents with   • GI Problem     Discuss swallowing       HISTORY OF PRESENT ILLNESS    Olive Bowman is a 79 y.o. female who presents for follow-up.  Dysphagia seems to be improving.  Was able to eat some popcorn chicken.  Still doing 4 cans of supplement per her G-tube and her weight is now up to 108 pounds.  She tells me that 105 pounds is the most she normally weighs.  Overall very pleased with how she is doing nutritionally.    Past Medical History:   Diagnosis Date   • Arthralgia    • Dyspepsia 9/5/2018    she is stable at this time. she is to take Gaviscon as needed and Omeprazole if persistent for more than a few weeks.    • Fibromyalgia    • Lichen planus    • Mouth ulcers 11/18/2016   • Osteoporosis    • Primary osteoarthritis of both hands    • Sicca (CMS/HCC)    • Ulcerative colitis (CMS/HCC)    • Vitamin D deficiency    • Xerostomia 11/19/2016       Past Surgical History:   Procedure Laterality Date   • BREAST EXCISIONAL BIOPSY Right     x 2 benign   • BREAST EXCISIONAL BIOPSY Left     x 1 benign   • BREAST LUMPECTOMY     • CHOLECYSTECTOMY     • COLONOSCOPY W/ BIOPSIES  05/22/2013    Changes consistent with collagenous colitis   • ENDOSCOPY  02/04/2015    Dilated normal exam   • ENDOSCOPY N/A 6/7/2019    Procedure: ESOPHAGOGASTRODUODENOSCOPY WITH ANESTHESIA;  Surgeon: Kun Cavanaugh MD;  Location: [facility] ENDOSCOPY;  Service: Gastroenterology   • GASTROSTOMY FEEDING TUBE INSERTION N/A 6/20/2019    Procedure: GASTROSTOMY FEEDING TUBE INSERTION;  Surgeon: Doreen White MD;  Location: [facility] OR;  Service: General   • HYSTERECTOMY           Current Outpatient Medications:   •  ALPRAZolam (XANAX) 1 MG tablet, Take 1 mg by mouth 2 (Two) Times a Day As Needed for Anxiety., Disp: , Rfl:   •  aluminum hydroxide-magnesium carbonate (GAVISCON)  MG/15ML suspension oral suspension,  Take 15 mL by mouth Every Night. Mix with 10ml lidocaine viscus, Disp: , Rfl:   •  Cholecalciferol (VITAMIN D3) 5000 units capsule capsule, Take 5,000 Units by mouth 4 (Four) Times a Week. Sunday, Monday, Wednesday, Friday., Disp: , Rfl:   •  estradiol (ESTRACE) 1 MG tablet, Take 1 mg by mouth Daily., Disp: , Rfl:   •  HYDROcodone-acetaminophen (NORCO)  MG per tablet, Take 1 tablet by mouth Every 6 (Six) Hours As Needed for Moderate Pain ., Disp: , Rfl:   •  lansoprazole (FIRST) 3 MG/ML suspension oral suspension, 10 mL by Nasogastric route Every Morning., Disp: 300 mL, Rfl: 2  •  latanoprost (XALATAN) 0.005 % ophthalmic solution, Administer 1 drop to both eyes Every Night., Disp: , Rfl:   •  lidocaine viscous (XYLOCAINE) 2 % solution, Take 10 mL by mouth Every Night. Mix with Gaviscon., Disp: , Rfl:   •  Miracle mouthwash oral suspension, Swish and spit 15 mL 3 (Three) Times a Day As Needed (mouth sores)., Disp: , Rfl:   •  orphenadrine (NORFLEX) 100 MG 12 hr tablet, Take 100 mg by mouth 2 (Two) Times a Day., Disp: , Rfl:   •  Probiotic Product (PROBIOTIC PO), Take 1 capsule by mouth Daily., Disp: , Rfl:   •  silver sulfadiazine (SILVADENE, SSD) 1 % cream, Apply  topically to the appropriate area as directed 2 (Two) Times a Day., Disp: 50 g, Rfl: 2  •  triamcinolone (KENALOG) 0.1 % cream, Apply  topically to the appropriate area as directed Every 12 (Twelve) Hours. For Lichen planus, Disp: 453.6 g, Rfl: 2  •  vitamin B-12 (CYANOCOBALAMIN) 500 MCG tablet, Take 1,000 mcg by mouth Daily., Disp: , Rfl:   •  vitamin E 1000 UNIT capsule, Take 1,000 Units by mouth Daily., Disp: , Rfl:     Allergies   Allergen Reactions   • Azathioprine Other (See Comments)     High fever, rash, oral lesions   • Latex        Social History     Socioeconomic History   • Marital status:      Spouse name: Not on file   • Number of children: Not on file   • Years of education: Not on file   • Highest education level: Not on file  "  Tobacco Use   • Smoking status: Former Smoker     Last attempt to quit: 1979     Years since quittin.8   • Smokeless tobacco: Never Used   Substance and Sexual Activity   • Alcohol use: Yes     Alcohol/week: 2.4 oz     Types: 4 Glasses of wine per week   • Drug use: Defer   • Sexual activity: Defer       Family History   Problem Relation Age of Onset   • Cancer Mother    • Diabetes Paternal Grandmother    • Diabetes Paternal Grandfather    • Breast cancer Neg Hx    • Colon cancer Neg Hx    • Colon polyps Neg Hx        Review of Systems    Vitals:    19 1359   BP: 122/62   Pulse: 68   SpO2: 97%   Weight: 49 kg (108 lb)   Height: 162.6 cm (64\")    Body mass index is 18.54 kg/m².    Physical Exam    Lab Results - Last 18 Months   Lab Units 19  1756  19  0514  19  0440   WBC 10*3/mm3 7.10   < > 5.15  --  6.94   HEMOGLOBIN g/dL 9.9*   < > 9.8*  --  10.0*   HEMATOCRIT % 31.2*   < > 30.0*  --  31.0*   MCV fL 98.4*   < > 98.0  --  99.7*   PLATELETS 10*3/mm3 225   < > 317  --  182   GLUCOSE mg/dL 95   < > 81   < > 112*   BUN mg/dL 36*   < > 13   < > 16   CREATININE mg/dL 0.75   < > 0.78   < > 0.60   SODIUM mmol/L 137   < > 137   < > 133*   POTASSIUM mmol/L 4.4   < > 4.0   < > 4.0   CHLORIDE mmol/L 96*   < > 98   < > 99   CO2 mmol/L 34.0*   < > 35.0*   < > 31.0   TOTAL PROTEIN g/dL 6.9   < > 5.1*   < > 4.4*   ALBUMIN g/dL 4.00   < > 2.50*   < > 2.20*   ALT (SGPT) U/L 26   < > 61*   < > 73*   AST (SGOT) U/L 33   < > 40   < > 26   ALK PHOS U/L 107   < > 92   < > 125*   BILIRUBIN mg/dL 0.2   < > 0.2   < > 0.2   GLOBULIN gm/dL 2.9   < > 2.6   < > 2.2   CRP mg/dL  --   --  3.74*  --  8.73*   SED RATE mm/hr  --   --   --   --  3    < > = values in this interval not displayed.         ASSESSMENT AND PLAN      1. Non-intractable vomiting with nausea  Overall she is doing better.  Weight is increased.  I told her to decrease her G-tube supplements to 2 cans daily and get Ensure or boost to drink 2 cans " daily and continue her oral intake.  Hopefully if she maintains her weight we can get her G-tube out by [place]       EMR Dragon/transcription disclaimer: Much of this encounter note is an electronic transcription/translation of spoken language to printed text.  The electronic translation of spoken language may permit erroneous, or at times, nonsensical words or phrases to be inadvertently transcribed.  Although I have reviewed the note for such errors, some may still exist.    Kun Cavanaugh MD  11/11/2019  2:21 PM

## 2019-12-04 ENCOUNTER — TELEPHONE (OUTPATIENT)
Dept: GASTROENTEROLOGY | Facility: CLINIC | Age: 79
End: 2019-12-04

## 2019-12-04 NOTE — TELEPHONE ENCOUNTER
Had to cancel patient's appointment yesterday because in the emergency in the hospital.  Called her today for follow-up.  Continuing to do reasonably well.  Trying to increase her amount of oral intake.  Still using the G-tube and at this point she understands that removal at this time would not be in her best interest.  She had increase her supplements a bit to try to keep her weight up.  Her weight was 107 pounds yesterday.  We will plan to see her back after the first of the year.  She is instructed to call if she has any ongoing problems.

## 2020-01-03 ENCOUNTER — APPOINTMENT (OUTPATIENT)
Dept: GENERAL RADIOLOGY | Facility: HOSPITAL | Age: 80
End: 2020-01-03

## 2020-01-03 ENCOUNTER — HOSPITAL ENCOUNTER (EMERGENCY)
Facility: HOSPITAL | Age: 80
Discharge: HOME | End: 2020-01-03
Attending: EMERGENCY MEDICINE | Admitting: EMERGENCY MEDICINE

## 2020-01-03 VITALS
RESPIRATION RATE: 16 BRPM | SYSTOLIC BLOOD PRESSURE: 119 MMHG | WEIGHT: 106 LBS | HEART RATE: 70 BPM | OXYGEN SATURATION: 97 % | BODY MASS INDEX: 18.78 KG/M2 | DIASTOLIC BLOOD PRESSURE: 67 MMHG | TEMPERATURE: 98.2 F | HEIGHT: 63 IN

## 2020-01-03 DIAGNOSIS — T85.598A FEEDING TUBE DYSFUNCTION, INITIAL ENCOUNTER: Primary | ICD-10-CM

## 2020-01-03 PROCEDURE — 74018 RADEX ABDOMEN 1 VIEW: CPT

## 2020-01-03 PROCEDURE — 96372 THER/PROPH/DIAG INJ SC/IM: CPT

## 2020-01-03 PROCEDURE — 99283 EMERGENCY DEPT VISIT LOW MDM: CPT

## 2020-01-03 PROCEDURE — 25010000002 MORPHINE SULFATE (PF) 2 MG/ML SOLUTION: Performed by: EMERGENCY MEDICINE

## 2020-01-03 RX ORDER — MORPHINE SULFATE 2 MG/ML
2 INJECTION, SOLUTION INTRAMUSCULAR; INTRAVENOUS EVERY 4 HOURS PRN
Status: DISCONTINUED | OUTPATIENT
Start: 2020-01-03 | End: 2020-01-03 | Stop reason: HOSPADM

## 2020-01-03 RX ADMIN — MORPHINE SULFATE 2 MG: 2 INJECTION, SOLUTION INTRAMUSCULAR; INTRAVENOUS at 08:55

## 2020-01-03 NOTE — ED PROVIDER NOTES
Subjective   Patient is a 79-year-old female who presents to the ER with feeding tube issue.  Patient has had a feeding tube since June 2019 after she has some esophageal issues.  Currently she is receiving 3 feeds per day through her feeding tube.  Patient states that last night she went to bed around 1030 and her feeding tube was in place.  Patient states that when she woke up this morning the feeding tube was out.  Patient came straight to the ER.  She denies any fever, chest pain, shortness of air, abdominal pain, nausea vomiting diarrhea, urinary changes, neurologic changes.          Review of Systems   Constitutional: Negative.    HENT: Negative.    Eyes: Negative.    Respiratory: Negative.    Cardiovascular: Negative.    Gastrointestinal:        Feeding tube dislodged   Endocrine: Negative.    Genitourinary: Negative.    Musculoskeletal: Negative.    Skin: Negative.    Allergic/Immunologic: Negative.    Neurological: Negative.    Hematological: Negative.    Psychiatric/Behavioral: Negative.    All other systems reviewed and are negative.      Past Medical History:   Diagnosis Date   • Arthralgia    • Dyspepsia 9/5/2018    she is stable at this time. she is to take Gaviscon as needed and Omeprazole if persistent for more than a few weeks.    • Fibromyalgia    • Lichen planus    • Mouth ulcers 11/18/2016   • Osteoporosis    • Primary osteoarthritis of both hands    • Sicca (CMS/HCC)    • Ulcerative colitis (CMS/HCC)    • Vitamin D deficiency    • Xerostomia 11/19/2016       Allergies   Allergen Reactions   • Azathioprine Other (See Comments)     High fever, rash, oral lesions   • Latex        Past Surgical History:   Procedure Laterality Date   • BREAST EXCISIONAL BIOPSY Right     x 2 benign   • BREAST EXCISIONAL BIOPSY Left     x 1 benign   • BREAST LUMPECTOMY     • CHOLECYSTECTOMY     • COLONOSCOPY W/ BIOPSIES  05/22/2013    Changes consistent with collagenous colitis   • ENDOSCOPY  02/04/2015    Dilated  normal exam   • ENDOSCOPY N/A 2019    Procedure: ESOPHAGOGASTRODUODENOSCOPY WITH ANESTHESIA;  Surgeon: Kun Cavanaugh MD;  Location: [facility] ENDOSCOPY;  Service: Gastroenterology   • GASTROSTOMY FEEDING TUBE INSERTION N/A 2019    Procedure: GASTROSTOMY FEEDING TUBE INSERTION;  Surgeon: Doreen White MD;  Location: [facility] OR;  Service: General   • HYSTERECTOMY         Family History   Problem Relation Age of Onset   • Cancer Mother    • Diabetes Paternal Grandmother    • Diabetes Paternal Grandfather    • Breast cancer Neg Hx    • Colon cancer Neg Hx    • Colon polyps Neg Hx        Social History     Socioeconomic History   • Marital status:      Spouse name: Not on file   • Number of children: Not on file   • Years of education: Not on file   • Highest education level: Not on file   Tobacco Use   • Smoking status: Former Smoker     Last attempt to quit: 1979     Years since quittin.0   • Smokeless tobacco: Never Used   Substance and Sexual Activity   • Alcohol use: Yes     Alcohol/week: 4.0 standard drinks     Types: 4 Glasses of wine per week   • Drug use: Defer   • Sexual activity: Defer           Objective   Physical Exam   Constitutional: She is oriented to person, place, and time. She appears well-developed and well-nourished.   HENT:   Head: Normocephalic and atraumatic.   Eyes: Pupils are equal, round, and reactive to light. Conjunctivae are normal.   Neck: Normal range of motion.   Cardiovascular: Normal rate, regular rhythm and normal heart sounds.   Pulmonary/Chest: Effort normal and breath sounds normal.   Abdominal: Soft. There is no tenderness.   Open wound to the left upper abdominal quadrant where feeding tube used to be in place with granulation tissue   Musculoskeletal: Normal range of motion. She exhibits no edema or deformity.   Neurological: She is alert and oriented to person, place, and time. She has normal strength.   Skin: Skin is warm.   Psychiatric: She has a  normal mood and affect. Her behavior is normal.   Nursing note and vitals reviewed.      Procedures           ED Course      We had no feeding tubes available in the ER upon patient arrival.  An 18-gauge Stephenson catheter was placed without difficulty in the patients feeding tube location.  I then obtained a feeding tube.  Attempted to place a 22-gauge but was unsuccessful.  I then placed a 20-gauge feeding tube without difficulty.  KUB with Gastrografin was performed and showed the feeding tube to be in correct position.  Patient was then discharged home to follow-up with her PCP as needed.  Return for any concerns.    XR Abdomen KUB    (Results Pending)     XR Abdomen KUB   Final Result   1. Intraluminal positioning of the gastrostomy tube within the body of   the stomach.   This report was finalized on 01/03/2020 10:26 by Dr. Neela Parikh MD.                                               [facility]    Final diagnoses:   Feeding tube dysfunction, initial encounter            Fatou Alcocer MD  01/03/20 1034       Fatou Alcocer MD  01/03/20 1038

## 2020-01-16 ENCOUNTER — OFFICE VISIT (OUTPATIENT)
Dept: GASTROENTEROLOGY | Facility: CLINIC | Age: 80
End: 2020-01-16

## 2020-01-16 VITALS
WEIGHT: 110 LBS | SYSTOLIC BLOOD PRESSURE: 128 MMHG | HEART RATE: 61 BPM | OXYGEN SATURATION: 99 % | BODY MASS INDEX: 18.78 KG/M2 | DIASTOLIC BLOOD PRESSURE: 80 MMHG | HEIGHT: 64 IN

## 2020-01-16 DIAGNOSIS — R13.10 ODYNOPHAGIA: Primary | ICD-10-CM

## 2020-01-16 PROCEDURE — 99212 OFFICE O/P EST SF 10 MIN: CPT | Performed by: INTERNAL MEDICINE

## 2020-01-16 NOTE — PROGRESS NOTES
[facility] - Office note  1/16/2020    Olive Bowman 1940     Chief Complaint   Patient presents with   • GI Problem       HISTORY OF PRESENT ILLNESS    Olive Bowman is a 79 y.o. female who presents for follow-up.  Overall doing well.  Her G-tube fell out and she went to the emergency room and had it replaced.  Still using it for 3 cans of supplement a day.  She is eating better.  Told me last night she had Italian roast beef mashed potatoes and bread.  Just eats a small amount.  Drinking supplements as well.  Weight is up to 110 pounds.    Past Medical History:   Diagnosis Date   • Arthralgia    • Dyspepsia 9/5/2018    she is stable at this time. she is to take Gaviscon as needed and Omeprazole if persistent for more than a few weeks.    • Fibromyalgia    • Lichen planus    • Mouth ulcers 11/18/2016   • Osteoporosis    • Primary osteoarthritis of both hands    • Sicca (CMS/HCC)    • Ulcerative colitis (CMS/HCC)    • Vitamin D deficiency    • Xerostomia 11/19/2016       Past Surgical History:   Procedure Laterality Date   • BREAST EXCISIONAL BIOPSY Right     x 2 benign   • BREAST EXCISIONAL BIOPSY Left     x 1 benign   • BREAST LUMPECTOMY     • CHOLECYSTECTOMY     • COLONOSCOPY W/ BIOPSIES  05/22/2013    Changes consistent with collagenous colitis   • ENDOSCOPY  02/04/2015    Dilated normal exam   • ENDOSCOPY N/A 6/7/2019    Procedure: ESOPHAGOGASTRODUODENOSCOPY WITH ANESTHESIA;  Surgeon: Kun Cavanaugh MD;  Location: [facility] ENDOSCOPY;  Service: Gastroenterology   • GASTROSTOMY FEEDING TUBE INSERTION N/A 6/20/2019    Procedure: GASTROSTOMY FEEDING TUBE INSERTION;  Surgeon: Doreen White MD;  Location: [facility] OR;  Service: General   • HYSTERECTOMY           Current Outpatient Medications:   •  ALPRAZolam (XANAX) 1 MG tablet, Take 1 mg by mouth 2 (Two) Times a Day As Needed for Anxiety., Disp: , Rfl:   •  aluminum hydroxide-magnesium carbonate (GAVISCON)  MG/15ML  suspension oral suspension, Take 15 mL by mouth Every Night. Mix with 10ml lidocaine viscus, Disp: , Rfl:   •  Cholecalciferol (VITAMIN D3) 5000 units capsule capsule, Take 5,000 Units by mouth 4 (Four) Times a Week. Sunday, Monday, Wednesday, Friday., Disp: , Rfl:   •  estradiol (ESTRACE) 1 MG tablet, Take 1 mg by mouth Daily., Disp: , Rfl:   •  HYDROcodone-acetaminophen (NORCO)  MG per tablet, Take 1 tablet by mouth Every 6 (Six) Hours As Needed for Moderate Pain ., Disp: , Rfl:   •  lansoprazole (FIRST) 3 MG/ML suspension oral suspension, 10 mL by Nasogastric route Every Morning., Disp: 300 mL, Rfl: 2  •  latanoprost (XALATAN) 0.005 % ophthalmic solution, Administer 1 drop to both eyes Every Night., Disp: , Rfl:   •  lidocaine viscous (XYLOCAINE) 2 % solution, Take 10 mL by mouth Every Night. Mix with Gaviscon., Disp: , Rfl:   •  Miracle mouthwash oral suspension, Swish and spit 15 mL 3 (Three) Times a Day As Needed (mouth sores)., Disp: , Rfl:   •  orphenadrine (NORFLEX) 100 MG 12 hr tablet, Take 100 mg by mouth 2 (Two) Times a Day., Disp: , Rfl:   •  Probiotic Product (PROBIOTIC PO), Take 1 capsule by mouth Daily., Disp: , Rfl:   •  silver sulfadiazine (SILVADENE, SSD) 1 % cream, Apply  topically to the appropriate area as directed 2 (Two) Times a Day., Disp: 50 g, Rfl: 2  •  triamcinolone (KENALOG) 0.1 % cream, Apply  topically to the appropriate area as directed Every 12 (Twelve) Hours. For Lichen planus, Disp: 453.6 g, Rfl: 2  •  vitamin B-12 (CYANOCOBALAMIN) 500 MCG tablet, Take 1,000 mcg by mouth Daily., Disp: , Rfl:   •  vitamin E 1000 UNIT capsule, Take 1,000 Units by mouth Daily., Disp: , Rfl:     Allergies   Allergen Reactions   • Azathioprine Other (See Comments)     High fever, rash, oral lesions   • Latex        Social History     Socioeconomic History   • Marital status:      Spouse name: Not on file   • Number of children: Not on file   • Years of education: Not on file   • Highest  "education level: Not on file   Tobacco Use   • Smoking status: Former Smoker     Last attempt to quit: 1979     Years since quittin.0   • Smokeless tobacco: Never Used   Substance and Sexual Activity   • Alcohol use: Yes     Alcohol/week: 4.0 standard drinks     Types: 4 Glasses of wine per week   • Drug use: Defer   • Sexual activity: Defer       Family History   Problem Relation Age of Onset   • Cancer Mother    • Diabetes Paternal Grandmother    • Diabetes Paternal Grandfather    • Breast cancer Neg Hx    • Colon cancer Neg Hx    • Colon polyps Neg Hx        Review of Systems    Vitals:    20 1310   BP: 128/80   Pulse: 61   SpO2: 99%   Weight: 49.9 kg (110 lb)   Height: 162.6 cm (64\")    Body mass index is 18.88 kg/m².    Physical Exam    Lab Results - Last 18 Months   Lab Units 19  1756  19  0514  19  0440   WBC 10*3/mm3 7.10   < > 5.15  --  6.94   HEMOGLOBIN g/dL 9.9*   < > 9.8*  --  10.0*   HEMATOCRIT % 31.2*   < > 30.0*  --  31.0*   MCV fL 98.4*   < > 98.0  --  99.7*   PLATELETS 10*3/mm3 225   < > 317  --  182   GLUCOSE mg/dL 95   < > 81   < > 112*   BUN mg/dL 36*   < > 13   < > 16   CREATININE mg/dL 0.75   < > 0.78   < > 0.60   SODIUM mmol/L 137   < > 137   < > 133*   POTASSIUM mmol/L 4.4   < > 4.0   < > 4.0   CHLORIDE mmol/L 96*   < > 98   < > 99   CO2 mmol/L 34.0*   < > 35.0*   < > 31.0   TOTAL PROTEIN g/dL 6.9   < > 5.1*   < > 4.4*   ALBUMIN g/dL 4.00   < > 2.50*   < > 2.20*   ALT (SGPT) U/L 26   < > 61*   < > 73*   AST (SGOT) U/L 33   < > 40   < > 26   ALK PHOS U/L 107   < > 92   < > 125*   BILIRUBIN mg/dL 0.2   < > 0.2   < > 0.2   GLOBULIN gm/dL 2.9   < > 2.6   < > 2.2   CRP mg/dL  --   --  3.74*  --  8.73*   SED RATE mm/hr  --   --   --   --  3    < > = values in this interval not displayed.         ASSESSMENT AND PLAN      1. Odynophagia  Symptoms have improved.  She still not just taking oral but is still supplementing with the G-tube.  Not certain if is that she cannot " eat enough or she is just fearful she will need enough but at this time she is satisfied with using the G-tube 2-3 times a day for supplement.  I do not plan to change at this time.  We will see her back in 4 months.       EMR Dragon/transcription disclaimer: Much of this encounter note is an electronic transcription/translation of spoken language to printed text.  The electronic translation of spoken language may permit erroneous, or at times, nonsensical words or phrases to be inadvertently transcribed.  Although I have reviewed the note for such errors, some may still exist.    Kun Cavanaugh MD  1/16/2020  1:25 PM

## 2020-07-07 ENCOUNTER — OFFICE VISIT (OUTPATIENT)
Dept: GASTROENTEROLOGY | Facility: CLINIC | Age: 80
End: 2020-07-07

## 2020-07-07 VITALS
BODY MASS INDEX: 19.12 KG/M2 | HEIGHT: 64 IN | RESPIRATION RATE: 16 BRPM | HEART RATE: 80 BPM | WEIGHT: 112 LBS | SYSTOLIC BLOOD PRESSURE: 130 MMHG | DIASTOLIC BLOOD PRESSURE: 70 MMHG

## 2020-07-07 DIAGNOSIS — R10.13 DYSPEPSIA: Primary | ICD-10-CM

## 2020-07-07 PROCEDURE — 99213 OFFICE O/P EST LOW 20 MIN: CPT | Performed by: INTERNAL MEDICINE

## 2020-07-07 RX ORDER — LANSOPRAZOLE
30 KIT
Qty: 1800 ML | Refills: 0 | Status: SHIPPED | OUTPATIENT
Start: 2020-07-07 | End: 2020-10-05

## 2020-07-07 NOTE — PROGRESS NOTES
[facility] Gastroenterology - Office note  7/7/2020    Olive Bowman 1940     Chief Complaint   Patient presents with   • GI Problem     Discuss swallowing and eating       HISTORY OF PRESENT ILLNESS    Olive Bowman is a 79 y.o. female who presents for follow-up.  Continues to need to use the G-tube.  3 cans of supplement a day.  Weight is 112 pounds.  Still complaining of some difficulty swallowing.  Not taking omeprazole or other medications for acid.  Complaining of reflux.    Past Medical History:   Diagnosis Date   • Arthralgia    • Dyspepsia 9/5/2018    she is stable at this time. she is to take Gaviscon as needed and Omeprazole if persistent for more than a few weeks.    • Fibromyalgia    • Lichen planus    • Mouth ulcers 11/18/2016   • Osteoporosis    • Primary osteoarthritis of both hands    • Sicca (CMS/HCC)    • Ulcerative colitis (CMS/HCC)    • Vitamin D deficiency    • Xerostomia 11/19/2016       Past Surgical History:   Procedure Laterality Date   • BREAST EXCISIONAL BIOPSY Right     x 2 benign   • BREAST EXCISIONAL BIOPSY Left     x 1 benign   • BREAST LUMPECTOMY     • CHOLECYSTECTOMY     • COLONOSCOPY W/ BIOPSIES  05/22/2013    Changes consistent with collagenous colitis   • ENDOSCOPY  02/04/2015    Dilated normal exam   • ENDOSCOPY N/A 6/7/2019    Benign esophageal stenosis   • GASTROSTOMY FEEDING TUBE INSERTION N/A 6/20/2019    Procedure: GASTROSTOMY FEEDING TUBE INSERTION;  Surgeon: Doreen White MD;  Location: [facility];  Service: General   • HYSTERECTOMY           Current Outpatient Medications:   •  ALPRAZolam (XANAX) 1 MG tablet, Take 1 mg by mouth 2 (Two) Times a Day As Needed for Anxiety., Disp: , Rfl:   •  aluminum hydroxide-magnesium carbonate (GAVISCON)  MG/15ML suspension oral suspension, Take 15 mL by mouth Every Night. Mix with 10ml lidocaine viscus, Disp: , Rfl:   •  Cholecalciferol (VITAMIN D3) 5000 units capsule capsule, Take 5,000 Units by mouth 4 (Four)  Times a Week. , Monday, Wednesday, Friday., Disp: , Rfl:   •  estradiol (ESTRACE) 1 MG tablet, Take 1 mg by mouth Daily., Disp: , Rfl:   •  HYDROcodone-acetaminophen (NORCO)  MG per tablet, Take 1 tablet by mouth Every 6 (Six) Hours As Needed for Moderate Pain ., Disp: , Rfl:   •  lansoprazole (FIRST) 3 MG/ML suspension oral suspension, 10 mL by Nasogastric route Every Morning., Disp: 300 mL, Rfl: 2  •  latanoprost (XALATAN) 0.005 % ophthalmic solution, Administer 1 drop to both eyes Every Night., Disp: , Rfl:   •  lidocaine viscous (XYLOCAINE) 2 % solution, Take 10 mL by mouth Every Night. Mix with Gaviscon., Disp: , Rfl:   •  Miracle mouthwash oral suspension, Swish and spit 15 mL 3 (Three) Times a Day As Needed (mouth sores)., Disp: , Rfl:   •  orphenadrine (NORFLEX) 100 MG 12 hr tablet, Take 100 mg by mouth 2 (Two) Times a Day., Disp: , Rfl:   •  Probiotic Product (PROBIOTIC PO), Take 1 capsule by mouth Daily., Disp: , Rfl:   •  silver sulfadiazine (SILVADENE, SSD) 1 % cream, Apply  topically to the appropriate area as directed 2 (Two) Times a Day., Disp: 50 g, Rfl: 2  •  triamcinolone (KENALOG) 0.1 % cream, Apply  topically to the appropriate area as directed Every 12 (Twelve) Hours. For Lichen planus, Disp: 453.6 g, Rfl: 2  •  vitamin B-12 (CYANOCOBALAMIN) 500 MCG tablet, Take 1,000 mcg by mouth Daily., Disp: , Rfl:   •  vitamin E 1000 UNIT capsule, Take 1,000 Units by mouth Daily., Disp: , Rfl:     Allergies   Allergen Reactions   • Azathioprine Other (See Comments)     High fever, rash, oral lesions   • Latex        Social History     Socioeconomic History   • Marital status:      Spouse name: Not on file   • Number of children: Not on file   • Years of education: Not on file   • Highest education level: Not on file   Tobacco Use   • Smoking status: Former Smoker     Last attempt to quit: 1979     Years since quittin.5   • Smokeless tobacco: Never Used   Substance and Sexual Activity  "  • Alcohol use: Yes     Alcohol/week: 4.0 standard drinks     Types: 4 Glasses of wine per week   • Drug use: Defer   • Sexual activity: Defer       Family History   Problem Relation Age of Onset   • Cancer Mother    • Diabetes Paternal Grandmother    • Diabetes Paternal Grandfather    • Breast cancer Neg Hx    • Colon cancer Neg Hx    • Colon polyps Neg Hx        Review of Systems   Respiratory: Negative.    Cardiovascular: Negative.    Gastrointestinal: Negative.        Vitals:    07/07/20 1401   BP: 130/70   Pulse: 80   Resp: 16   Weight: 50.8 kg (112 lb)   Height: 162.6 cm (64\")    Body mass index is 19.22 kg/m².    Physical Exam   Cardiovascular: Normal rate.   Pulmonary/Chest: Effort normal.   Abdominal: Soft.       Lab Results - Last 18 Months   Lab Units 07/18/19  1756  06/19/19  0514  06/17/19  0440   WBC 10*3/mm3 7.10   < > 5.15  --  6.94   HEMOGLOBIN g/dL 9.9*   < > 9.8*  --  10.0*   HEMATOCRIT % 31.2*   < > 30.0*  --  31.0*   MCV fL 98.4*   < > 98.0  --  99.7*   PLATELETS 10*3/mm3 225   < > 317  --  182   GLUCOSE mg/dL 95   < > 81   < > 112*   BUN mg/dL 36*   < > 13   < > 16   CREATININE mg/dL 0.75   < > 0.78   < > 0.60   SODIUM mmol/L 137   < > 137   < > 133*   POTASSIUM mmol/L 4.4   < > 4.0   < > 4.0   CHLORIDE mmol/L 96*   < > 98   < > 99   CO2 mmol/L 34.0*   < > 35.0*   < > 31.0   TOTAL PROTEIN g/dL 6.9   < > 5.1*   < > 4.4*   ALBUMIN g/dL 4.00   < > 2.50*   < > 2.20*   ALT (SGPT) U/L 26   < > 61*   < > 73*   AST (SGOT) U/L 33   < > 40   < > 26   ALK PHOS U/L 107   < > 92   < > 125*   BILIRUBIN mg/dL 0.2   < > 0.2   < > 0.2   GLOBULIN gm/dL 2.9   < > 2.6   < > 2.2   CRP mg/dL  --   --  3.74*  --  8.73*   SED RATE mm/hr  --   --   --   --  3    < > = values in this interval not displayed.         ASSESSMENT AND PLAN      1. Dyspepsia  Continue G-tube feeds for now.  I will add Prevacid suspension twice daily to see if this will control her reflux.  Continue to eat as tolerated.  If recurrent " dysphagia will consider referral back to [place] to see Dr. Carlson.       EMR Dragon/transcription disclaimer: Much of this encounter note is an electronic transcription/translation of spoken language to printed text.  The electronic translation of spoken language may permit erroneous, or at times, nonsensical words or phrases to be inadvertently transcribed.  Although I have reviewed the note for such errors, some may still exist.    Kun Cavanaugh MD  7/7/2020  14:23

## 2020-07-20 PROCEDURE — U0003 INFECTIOUS AGENT DETECTION BY NUCLEIC ACID (DNA OR RNA); SEVERE ACUTE RESPIRATORY SYNDROME CORONAVIRUS 2 (SARS-COV-2) (CORONAVIRUS DISEASE [COVID-19]), AMPLIFIED PROBE TECHNIQUE, MAKING USE OF HIGH THROUGHPUT TECHNOLOGIES AS DESCRIBED BY CMS-2020-01-R: HCPCS | Performed by: INTERNAL MEDICINE

## 2020-08-25 ENCOUNTER — TRANSCRIBE ORDERS (OUTPATIENT)
Dept: ADMINISTRATIVE | Facility: HOSPITAL | Age: 80
End: 2020-08-25

## 2020-08-25 DIAGNOSIS — Z12.31 ENCOUNTER FOR SCREENING MAMMOGRAM FOR MALIGNANT NEOPLASM OF BREAST: Primary | ICD-10-CM

## 2020-08-25 DIAGNOSIS — Z78.0 POSTMENOPAUSAL STATUS: ICD-10-CM

## 2020-09-02 ENCOUNTER — HOSPITAL ENCOUNTER (OUTPATIENT)
Dept: MAMMOGRAPHY | Facility: HOSPITAL | Age: 80
Discharge: HOME | End: 2020-09-02
Admitting: INTERNAL MEDICINE

## 2020-09-02 ENCOUNTER — HOSPITAL ENCOUNTER (OUTPATIENT)
Dept: BONE DENSITY | Facility: HOSPITAL | Age: 80
Discharge: HOME | End: 2020-09-02

## 2020-09-02 DIAGNOSIS — Z12.31 ENCOUNTER FOR SCREENING MAMMOGRAM FOR MALIGNANT NEOPLASM OF BREAST: ICD-10-CM

## 2020-09-02 DIAGNOSIS — Z78.0 POSTMENOPAUSAL STATUS: ICD-10-CM

## 2020-09-02 PROCEDURE — 77063 BREAST TOMOSYNTHESIS BI: CPT

## 2020-09-02 PROCEDURE — 77080 DXA BONE DENSITY AXIAL: CPT

## 2020-09-02 PROCEDURE — 77067 SCR MAMMO BI INCL CAD: CPT

## 2020-09-25 ENCOUNTER — APPOINTMENT (OUTPATIENT)
Dept: GENERAL RADIOLOGY | Facility: HOSPITAL | Age: 80
End: 2020-09-25

## 2020-09-25 ENCOUNTER — NURSE TRIAGE (OUTPATIENT)
Dept: CALL CENTER | Facility: HOSPITAL | Age: 80
End: 2020-09-25

## 2020-09-25 ENCOUNTER — HOSPITAL ENCOUNTER (EMERGENCY)
Facility: HOSPITAL | Age: 80
Discharge: HOME | End: 2020-09-25
Attending: EMERGENCY MEDICINE | Admitting: EMERGENCY MEDICINE

## 2020-09-25 VITALS
SYSTOLIC BLOOD PRESSURE: 133 MMHG | WEIGHT: 108 LBS | OXYGEN SATURATION: 96 % | BODY MASS INDEX: 18.44 KG/M2 | RESPIRATION RATE: 16 BRPM | HEART RATE: 75 BPM | DIASTOLIC BLOOD PRESSURE: 60 MMHG | HEIGHT: 64 IN | TEMPERATURE: 99.1 F

## 2020-09-25 DIAGNOSIS — Z43.1 ATTENTION TO G-TUBE (HCC): Primary | ICD-10-CM

## 2020-09-25 PROCEDURE — 96374 THER/PROPH/DIAG INJ IV PUSH: CPT

## 2020-09-25 PROCEDURE — 99283 EMERGENCY DEPT VISIT LOW MDM: CPT

## 2020-09-25 PROCEDURE — 25010000002 FENTANYL CITRATE (PF) 100 MCG/2ML SOLUTION: Performed by: EMERGENCY MEDICINE

## 2020-09-25 PROCEDURE — 74018 RADEX ABDOMEN 1 VIEW: CPT

## 2020-09-25 PROCEDURE — 63710000001 ONDANSETRON ODT 4 MG TABLET DISPERSIBLE: Performed by: EMERGENCY MEDICINE

## 2020-09-25 RX ORDER — ONDANSETRON 4 MG/1
4 TABLET, ORALLY DISINTEGRATING ORAL ONCE
Status: COMPLETED | OUTPATIENT
Start: 2020-09-25 | End: 2020-09-25

## 2020-09-25 RX ORDER — FENTANYL CITRATE 50 UG/ML
100 INJECTION, SOLUTION INTRAMUSCULAR; INTRAVENOUS ONCE
Status: COMPLETED | OUTPATIENT
Start: 2020-09-25 | End: 2020-09-25

## 2020-09-25 RX ADMIN — ONDANSETRON 4 MG: 4 TABLET, ORALLY DISINTEGRATING ORAL at 13:54

## 2020-09-25 RX ADMIN — FENTANYL CITRATE 75 MCG: 50 INJECTION, SOLUTION INTRAMUSCULAR; INTRAVENOUS at 13:52

## 2020-09-25 NOTE — TELEPHONE ENCOUNTER
"Reviewed guideline with caller, advises she be seen within 4 hours. Caller states she will come to ED to have it evaluated. Notified ED CN pt is on her way in to have this evaluated.     Reason for Disposition  • [1] G-tube is broken or cracked AND [2] is not usable    Additional Information  • Negative: Shock suspected (e.g., cold/pale/clammy skin, too weak to stand, low BP, rapid pulse)  • Negative: [1] Shortness of breath AND [2] new onset  • Negative: Bluish (or gray) lips or face now  • Negative: Sounds like a life-threatening emergency to the triager  • Negative: SEVERE abdominal pain  • Negative: [1] Vomiting AND [2] contains red blood or black (\"coffee ground\") material  (Exception: few red streaks in vomit that only happened once)  • Negative: [1] Vomiting AND [2] contains bile (green color)  • Negative: Tube contains red blood or black (\"coffee ground\") material  (Exception: pink tinge of tube fluid occurs once and clears immediately with irrigation.)  • Negative: G-tube, J-tube, or GJ-tube came completely out of site in abdominal wall  • Negative: Difficulty breathing  • Negative: Dehydration suspected (e.g., no urine > 12 hours, very dry mouth, lightheaded)  • Negative: Patient sounds very sick or weak to the triager  • Negative: Vomiting > 4 times in the past 4 hours  • Negative: Diarrhea > 4 times in the past 4 hours  • Negative: [1] G-tube is clogged AND [2] irrigation has been attempted without success    Answer Assessment - Initial Assessment Questions  1. WHAT TYPE: \"What type of feeding tube is it?\" (e.g., NG tube, NJ tube, G tube, GJ tube, J tube; PEG).      PEG tube  2. WHEN INSERTED: \"When was the tube put in\", \"Has it been changed, if so when?\"      Last year in July, has been changed twice, last one in March  3. WHO INSERTED: \"Do you recall who put the tube in?\"      Dr. White inserted, Dr Alcocer in ED put in one  4. FEEDINGS: \"Has the type, rate or concentration of the tube feedings " "changed?\"      no  5. CHIEF COMPLAINT: \"What is the problem currently?\"      Leaking TF around the insertion site   6. VOMITING and DIARRHEA: \"Is there new vomiting or diarrhea?\" \"Please describe.\"      no  7. ONSET: \"How long, when did this start?\"        no  8. OTHER SYMPTOMS: \"What other symptoms are you having?\" (e.g., shortness of breath, fever, abdominal pain)      no  9. HOME HEALTH NURSE: \"Do you have a home health (visiting) nurse?\"      no    Protocols used: FEEDING TUBE SYMPTOMS AND QUESTIONS-ADULT-      "

## 2020-09-25 NOTE — ED PROVIDER NOTES
"Subjective   This 79-year-old female patient presents with a G-tube problem.  She had a G-tube placed a few years ago because of a \"blocked esophagus.  She has been using it ever since but she states that last night when she went to do her G-tube feeding it seemed to be having trouble pushing it through so she ended up pushing kindhearted unblocked did but then it was leaking avid afterwards and this morning when she tried to use it again it was leaking so she came in.  I asked her about the esophageal blockage she states she had dilatation of her esophagus at one point but then after that that they ended up just having to put NG tube.  She is not got any mass or anything just severe strictures apparently.  Also has fibromyalgia.          Review of Systems   Constitutional: Negative for chills, diaphoresis and fever.   HENT: Negative.    Respiratory: Negative.    Cardiovascular: Negative for chest pain and palpitations.   Gastrointestinal: Negative for abdominal pain, nausea and vomiting.        Patient's G-tube is present still in the stoma at this time.   Genitourinary: Negative.    Musculoskeletal: Negative.    Neurological: Negative.    Psychiatric/Behavioral: Negative.        Past Medical History:   Diagnosis Date   • Arthralgia    • Dyspepsia 9/5/2018    she is stable at this time. she is to take Gaviscon as needed and Omeprazole if persistent for more than a few weeks.    • Fibromyalgia    • Lichen planus    • Mouth ulcers 11/18/2016   • Osteoporosis    • Primary osteoarthritis of both hands    • Sicca (CMS/HCC)    • Ulcerative colitis (CMS/HCC)    • Vitamin D deficiency    • Xerostomia 11/19/2016       Allergies   Allergen Reactions   • Azathioprine Other (See Comments)     High fever, rash, oral lesions   • Latex        Past Surgical History:   Procedure Laterality Date   • BREAST EXCISIONAL BIOPSY Right     x 2 benign   • BREAST EXCISIONAL BIOPSY Left     x 1 benign   • BREAST LUMPECTOMY     • " CHOLECYSTECTOMY     • COLONOSCOPY W/ BIOPSIES  2013    Changes consistent with collagenous colitis   • ENDOSCOPY  2015    Dilated normal exam   • ENDOSCOPY N/A 2019    Benign esophageal stenosis   • GASTROSTOMY FEEDING TUBE INSERTION N/A 2019    Procedure: GASTROSTOMY FEEDING TUBE INSERTION;  Surgeon: Doreen White MD;  Location: [facility] OR;  Service: General   • HYSTERECTOMY         Family History   Problem Relation Age of Onset   • Cancer Mother    • Diabetes Paternal Grandmother    • Diabetes Paternal Grandfather    • Breast cancer Neg Hx    • Colon cancer Neg Hx    • Colon polyps Neg Hx        Social History     Socioeconomic History   • Marital status:      Spouse name: Not on file   • Number of children: Not on file   • Years of education: Not on file   • Highest education level: Not on file   Tobacco Use   • Smoking status: Former Smoker     Quit date:      Years since quittin.7   • Smokeless tobacco: Never Used   Substance and Sexual Activity   • Alcohol use: Yes     Alcohol/week: 4.0 standard drinks     Types: 4 Glasses of wine per week   • Drug use: Defer   • Sexual activity: Defer           Objective   Physical Exam  Constitutional:       General: She is not in acute distress.     Appearance: Normal appearance. She is normal weight.   HENT:      Head: Normocephalic and atraumatic.      Nose: Nose normal.      Mouth/Throat:      Mouth: Mucous membranes are moist.   Eyes:      Extraocular Movements: Extraocular movements intact.      Pupils: Pupils are equal, round, and reactive to light.   Neck:      Musculoskeletal: Normal range of motion and neck supple.   Cardiovascular:      Rate and Rhythm: Normal rate and regular rhythm.      Pulses: Normal pulses.   Pulmonary:      Effort: Pulmonary effort is normal.      Breath sounds: Normal breath sounds.   Abdominal:      General: Abdomen is flat.      Comments: Patient is G-tube present   Musculoskeletal: Normal range of  motion.   Skin:     General: Skin is warm and dry.   Neurological:      General: No focal deficit present.      Mental Status: She is alert and oriented to person, place, and time.   Psychiatric:         Mood and Affect: Mood normal.         Behavior: Behavior normal.         Thought Content: Thought content normal.         Judgment: Judgment normal.         Feeding Tube Replacement    Date/Time: 9/25/2020 2:26 PM  Performed by: Aris Vaz DO  Authorized by: Aris Vaz DO     Consent:     Consent obtained:  Verbal    Consent given by:  Patient    Risks discussed:  Pain and bleeding  Pre-procedure details:     Old tube type:  Gastrostomy    Old tube size: 22 FR.  Sedation:     Sedation type: Fentanyl 75 mcg.  Anesthesia (see MAR for exact dosages):     Anesthesia method:  None  Procedure details:     Patient position:  Recumbent    Procedure type:  Replacement    Tube type:  Gastrostomy    Tube size: 22 FR.    Bulb inflation volume:  7 ml    Bulb inflation fluid:  Sterile water  Post-procedure details:     Placement/position confirmation:  Contrast and x-ray    Placement difficulty:  None    Bleeding:  None    Patient tolerance of procedure:  Tolerated well, no immediate complications  Comments:      X-ray with contrast demonstrates good placement of the G-tube and contrast going into the stomach and into the small intestine.               ED Course                                           MDM    Final diagnoses:   Attention to G-tube (CMS/[facility])            Aris Vaz DO  09/25/20 [id number]

## 2020-10-12 ENCOUNTER — OFFICE VISIT (OUTPATIENT)
Dept: GASTROENTEROLOGY | Facility: CLINIC | Age: 80
End: 2020-10-12

## 2020-10-12 VITALS
OXYGEN SATURATION: 96 % | HEIGHT: 64 IN | DIASTOLIC BLOOD PRESSURE: 78 MMHG | SYSTOLIC BLOOD PRESSURE: 128 MMHG | HEART RATE: 86 BPM | TEMPERATURE: 97.3 F | BODY MASS INDEX: 18.44 KG/M2 | WEIGHT: 108 LBS

## 2020-10-12 DIAGNOSIS — R13.19 ESOPHAGEAL DYSPHAGIA: Primary | ICD-10-CM

## 2020-10-12 PROCEDURE — 99212 OFFICE O/P EST SF 10 MIN: CPT | Performed by: INTERNAL MEDICINE

## 2020-10-12 NOTE — PROGRESS NOTES
[facility] Gastroenterology - Office note  10/12/2020    Olive Bowman 1940     Chief Complaint   Patient presents with   • GI Problem     Had blocked tube and no having problems with acid and some bloating       HISTORY OF PRESENT ILLNESS    Olive Bowman is a 79 y.o. female who presents for follow-up.  Have some issues with her PEG tube.  Had a leak and went to the emergency room where they exchanged her tube.  Confirmed placement by Gastrografin.  Complained of it being a bit tight and I was able move the bolster out about a half an inch.  Having some issues with bloating.  She has been making homemade boiled custard and eating yogurt along with her protein shakes.  Weight is relatively stable.    Past Medical History:   Diagnosis Date   • Arthralgia    • Dyspepsia 9/5/2018    she is stable at this time. she is to take Gaviscon as needed and Omeprazole if persistent for more than a few weeks.    • Fibromyalgia    • Lichen planus    • Mouth ulcers 11/18/2016   • Osteoporosis    • Primary osteoarthritis of both hands    • Sicca (CMS/HCC)    • Ulcerative colitis (CMS/HCC)    • Vitamin D deficiency    • Xerostomia 11/19/2016       Past Surgical History:   Procedure Laterality Date   • BREAST EXCISIONAL BIOPSY Right     x 2 benign   • BREAST EXCISIONAL BIOPSY Left     x 1 benign   • BREAST LUMPECTOMY     • CHOLECYSTECTOMY     • COLONOSCOPY W/ BIOPSIES  05/22/2013    Changes consistent with collagenous colitis   • ENDOSCOPY  02/04/2015    Dilated normal exam   • ENDOSCOPY N/A 6/7/2019    Benign esophageal stenosis   • GASTROSTOMY FEEDING TUBE INSERTION N/A 6/20/2019    Procedure: GASTROSTOMY FEEDING TUBE INSERTION;  Surgeon: Doreen White MD;  Location: [facility] OR;  Service: General   • HYSTERECTOMY           Current Outpatient Medications:   •  ALPRAZolam (XANAX) 1 MG tablet, Take 1 mg by mouth 2 (Two) Times a Day As Needed for Anxiety., Disp: , Rfl:   •  aluminum hydroxide-magnesium carbonate  (GAVISCON)  MG/15ML suspension oral suspension, Take 15 mL by mouth Every Night. Mix with 10ml lidocaine viscus, Disp: , Rfl:   •  Cholecalciferol (VITAMIN D3) 5000 units capsule capsule, Take 5,000 Units by mouth 4 (Four) Times a Week. Sunday, Monday, Wednesday, Friday., Disp: , Rfl:   •  estradiol (ESTRACE) 1 MG tablet, Take 1 mg by mouth Daily., Disp: , Rfl:   •  HYDROcodone-acetaminophen (NORCO)  MG per tablet, Take 1 tablet by mouth Every 6 (Six) Hours As Needed for Moderate Pain ., Disp: , Rfl:   •  latanoprost (XALATAN) 0.005 % ophthalmic solution, Administer 1 drop to both eyes Every Night., Disp: , Rfl:   •  lidocaine viscous (XYLOCAINE) 2 % solution, Take 10 mL by mouth Every Night. Mix with Gaviscon., Disp: , Rfl:   •  Miracle mouthwash oral suspension, Swish and spit 15 mL 3 (Three) Times a Day As Needed (mouth sores)., Disp: , Rfl:   •  orphenadrine (NORFLEX) 100 MG 12 hr tablet, Take 100 mg by mouth 2 (Two) Times a Day., Disp: , Rfl:   •  Probiotic Product (PROBIOTIC PO), Take 1 capsule by mouth Daily., Disp: , Rfl:   •  silver sulfadiazine (SILVADENE, SSD) 1 % cream, Apply  topically to the appropriate area as directed 2 (Two) Times a Day., Disp: 50 g, Rfl: 2  •  triamcinolone (KENALOG) 0.1 % cream, Apply  topically to the appropriate area as directed Every 12 (Twelve) Hours. For Lichen planus, Disp: 453.6 g, Rfl: 2  •  vitamin B-12 (CYANOCOBALAMIN) 500 MCG tablet, Take 1,000 mcg by mouth Daily., Disp: , Rfl:   •  vitamin E 1000 UNIT capsule, Take 1,000 Units by mouth Daily., Disp: , Rfl:     Allergies   Allergen Reactions   • Azathioprine Other (See Comments)     High fever, rash, oral lesions   • Latex        Social History     Socioeconomic History   • Marital status:      Spouse name: Not on file   • Number of children: Not on file   • Years of education: Not on file   • Highest education level: Not on file   Tobacco Use   • Smoking status: Former Smoker     Quit date: 1979      "Years since quittin.8   • Smokeless tobacco: Never Used   Substance and Sexual Activity   • Alcohol use: Yes     Alcohol/week: 4.0 standard drinks     Types: 4 Glasses of wine per week   • Drug use: Defer   • Sexual activity: Defer       Family History   Problem Relation Age of Onset   • Cancer Mother    • Diabetes Paternal Grandmother    • Diabetes Paternal Grandfather    • Breast cancer Neg Hx    • Colon cancer Neg Hx    • Colon polyps Neg Hx        Review of Systems   Respiratory: Negative.    Cardiovascular: Negative.    Gastrointestinal: Positive for abdominal distention.       Vitals:    10/12/20 1342   BP: 128/78   Pulse: 86   Temp: 97.3 °F (36.3 °C)   SpO2: 96%   Weight: 49 kg (108 lb)   Height: 162.6 cm (64\")    Body mass index is 18.54 kg/m².    Physical Exam  Cardiovascular:      Rate and Rhythm: Normal rate.   Pulmonary:      Effort: Pulmonary effort is normal.   Abdominal:      Palpations: Abdomen is soft.   Neurological:      Mental Status: She is alert.         Lab Results - Last 18 Months   Lab Units 19  1756  19  0514  19  0440   WBC 10*3/mm3 7.10   < > 5.15  --  6.94   HEMOGLOBIN g/dL 9.9*   < > 9.8*  --  10.0*   HEMATOCRIT % 31.2*   < > 30.0*  --  31.0*   MCV fL 98.4*   < > 98.0  --  99.7*   PLATELETS 10*3/mm3 225   < > 317  --  182   GLUCOSE mg/dL 95   < > 81   < > 112*   BUN mg/dL 36*   < > 13   < > 16   CREATININE mg/dL 0.75   < > 0.78   < > 0.60   SODIUM mmol/L 137   < > 137   < > 133*   POTASSIUM mmol/L 4.4   < > 4.0   < > 4.0   CHLORIDE mmol/L 96*   < > 98   < > 99   CO2 mmol/L 34.0*   < > 35.0*   < > 31.0   TOTAL PROTEIN g/dL 6.9   < > 5.1*   < > 4.4*   ALBUMIN g/dL 4.00   < > 2.50*   < > 2.20*   ALT (SGPT) U/L 26   < > 61*   < > 73*   AST (SGOT) U/L 33   < > 40   < > 26   ALK PHOS U/L 107   < > 92   < > 125*   BILIRUBIN mg/dL 0.2   < > 0.2   < > 0.2   GLOBULIN gm/dL 2.9   < > 2.6   < > 2.2   CRP mg/dL  --   --  3.74*  --  8.73*   SED RATE mm/hr  --   --   --   --  3 "    < > = values in this interval not displayed.         ASSESSMENT AND PLAN      1. Esophageal dysphagia  G-tube site looks normal.  Tube moved to take some tension off the bolster.  Continue supplements.  I will see her back in January at routine scheduled appointment.       EMR Dragon/transcription disclaimer: Much of this encounter note is an electronic transcription/translation of spoken language to printed text.  The electronic translation of spoken language may permit erroneous, or at times, nonsensical words or phrases to be inadvertently transcribed.  Although I have reviewed the note for such errors, some may still exist.    Kun Cavanaugh MD  10/12/2020  14:29 CDT

## 2021-01-07 ENCOUNTER — OFFICE VISIT (OUTPATIENT)
Dept: GASTROENTEROLOGY | Facility: CLINIC | Age: 81
End: 2021-01-07

## 2021-01-07 VITALS
BODY MASS INDEX: 18.78 KG/M2 | HEART RATE: 88 BPM | SYSTOLIC BLOOD PRESSURE: 128 MMHG | DIASTOLIC BLOOD PRESSURE: 78 MMHG | OXYGEN SATURATION: 98 % | HEIGHT: 64 IN | WEIGHT: 110 LBS | TEMPERATURE: 97.3 F

## 2021-01-07 DIAGNOSIS — Z93.1 G TUBE FEEDINGS (HCC): Primary | ICD-10-CM

## 2021-01-07 PROCEDURE — 99212 OFFICE O/P EST SF 10 MIN: CPT | Performed by: INTERNAL MEDICINE

## 2021-01-07 NOTE — PROGRESS NOTES
[facility] Gastroenterology - Office note  1/7/2021    Olive Bowman 1940     Chief Complaint   Patient presents with   • GI Problem     Discuss swallowing       HISTORY OF PRESENT ILLNESS    Olive Bowman is a 80 y.o. female who presents for follow-up.  Once her G-tube removed.  Still taking 2-3 supplements a day per the tube.  Eating regular food without dysphagia or problems.  Weight is up 2 pounds to 110.    Past Medical History:   Diagnosis Date   • Arthralgia    • Dyspepsia 9/5/2018    she is stable at this time. she is to take Gaviscon as needed and Omeprazole if persistent for more than a few weeks.    • Fibromyalgia    • Lichen planus    • Mouth ulcers 11/18/2016   • Osteoporosis    • Primary osteoarthritis of both hands    • Sicca (CMS/HCC)    • Ulcerative colitis (CMS/HCC)    • Vitamin D deficiency    • Xerostomia 11/19/2016       Past Surgical History:   Procedure Laterality Date   • BREAST EXCISIONAL BIOPSY Right     x 2 benign   • BREAST EXCISIONAL BIOPSY Left     x 1 benign   • BREAST LUMPECTOMY     • CHOLECYSTECTOMY     • COLONOSCOPY W/ BIOPSIES  05/22/2013    Changes consistent with collagenous colitis   • ENDOSCOPY  02/04/2015    Dilated normal exam   • ENDOSCOPY N/A 6/7/2019    Benign esophageal stenosis   • GASTROSTOMY FEEDING TUBE INSERTION N/A 6/20/2019    Procedure: GASTROSTOMY FEEDING TUBE INSERTION;  Surgeon: Doreen White MD;  Location: [facility];  Service: General   • HYSTERECTOMY           Current Outpatient Medications:   •  ALPRAZolam (XANAX) 1 MG tablet, Take 1 mg by mouth 2 (Two) Times a Day As Needed for Anxiety., Disp: , Rfl:   •  aluminum hydroxide-magnesium carbonate (GAVISCON)  MG/15ML suspension oral suspension, Take 15 mL by mouth Every Night. Mix with 10ml lidocaine viscus, Disp: , Rfl:   •  Cholecalciferol (VITAMIN D3) 5000 units capsule capsule, Take 5,000 Units by mouth 4 (Four) Times a Week. Sunday, Monday, Wednesday, Friday., Disp: , Rfl:   •   estradiol (ESTRACE) 1 MG tablet, Take 1 mg by mouth Daily., Disp: , Rfl:   •  HYDROcodone-acetaminophen (NORCO)  MG per tablet, Take 1 tablet by mouth Every 6 (Six) Hours As Needed for Moderate Pain ., Disp: , Rfl:   •  latanoprost (XALATAN) 0.005 % ophthalmic solution, Administer 1 drop to both eyes Every Night., Disp: , Rfl:   •  lidocaine viscous (XYLOCAINE) 2 % solution, Take 10 mL by mouth Every Night. Mix with Gaviscon., Disp: , Rfl:   •  Miracle mouthwash oral suspension, Swish and spit 15 mL 3 (Three) Times a Day As Needed (mouth sores)., Disp: , Rfl:   •  orphenadrine (NORFLEX) 100 MG 12 hr tablet, Take 100 mg by mouth 2 (Two) Times a Day., Disp: , Rfl:   •  Probiotic Product (PROBIOTIC PO), Take 1 capsule by mouth Daily., Disp: , Rfl:   •  silver sulfadiazine (SILVADENE, SSD) 1 % cream, Apply  topically to the appropriate area as directed 2 (Two) Times a Day., Disp: 50 g, Rfl: 2  •  triamcinolone (KENALOG) 0.1 % cream, Apply  topically to the appropriate area as directed Every 12 (Twelve) Hours. For Lichen planus, Disp: 453.6 g, Rfl: 2  •  vitamin B-12 (CYANOCOBALAMIN) 500 MCG tablet, Take 1,000 mcg by mouth Daily., Disp: , Rfl:   •  vitamin E 1000 UNIT capsule, Take 1,000 Units by mouth Daily., Disp: , Rfl:     Allergies   Allergen Reactions   • Azathioprine Other (See Comments)     High fever, rash, oral lesions   • Latex        Social History     Socioeconomic History   • Marital status:      Spouse name: Not on file   • Number of children: Not on file   • Years of education: Not on file   • Highest education level: Not on file   Tobacco Use   • Smoking status: Former Smoker     Quit date:      Years since quittin.0   • Smokeless tobacco: Never Used   Substance and Sexual Activity   • Alcohol use: Yes     Alcohol/week: 4.0 standard drinks     Types: 4 Glasses of wine per week   • Drug use: Defer   • Sexual activity: Defer       Family History   Problem Relation Age of Onset   •  "Cancer Mother    • Diabetes Paternal Grandmother    • Diabetes Paternal Grandfather    • Breast cancer Neg Hx    • Colon cancer Neg Hx    • Colon polyps Neg Hx        Review of Systems   Respiratory: Negative.    Cardiovascular: Negative.    Gastrointestinal: Negative.        Vitals:    01/07/21 1324   BP: 128/78   Pulse: 88   Temp: 97.3 °F (36.3 °C)   SpO2: 98%   Weight: 49.9 kg (110 lb)   Height: 162.6 cm (64\")    Body mass index is 18.88 kg/m².    Physical Exam  HENT:      Head: Normocephalic.   Cardiovascular:      Pulses: Normal pulses.   Pulmonary:      Effort: Pulmonary effort is normal.   Abdominal:      Comments: PEG site clean and dry   Skin:     General: Skin is warm.   Neurological:      Mental Status: She is alert.         Lab Results - Last 18 Months   Lab Units 07/18/19  1756   WBC 10*3/mm3 7.10   HEMOGLOBIN g/dL 9.9*   HEMATOCRIT % 31.2*   MCV fL 98.4*   PLATELETS 10*3/mm3 225   GLUCOSE mg/dL 95   BUN mg/dL 36*   CREATININE mg/dL 0.75   SODIUM mmol/L 137   POTASSIUM mmol/L 4.4   CHLORIDE mmol/L 96*   CO2 mmol/L 34.0*   TOTAL PROTEIN g/dL 6.9   ALBUMIN g/dL 4.00   ALT (SGPT) U/L 26   AST (SGOT) U/L 33   ALK PHOS U/L 107   BILIRUBIN mg/dL 0.2   GLOBULIN gm/dL 2.9         ASSESSMENT AND PLAN      1. G tube feedings (CMS/[facility])  Advised her to stop using the tube at this time.  Flush with water daily.  All oral intake should be by mouth.  If she can maintain her weight will discuss removing the G-tube at her next visit.       EMR Dragon/transcription disclaimer: Much of this encounter note is an electronic transcription/translation of spoken language to printed text.  The electronic translation of spoken language may permit erroneous, or at times, nonsensical words or phrases to be inadvertently transcribed.  Although I have reviewed the note for such errors, some may still exist.    Kun Cavanaugh MD  1/7/2021  13:50 CST  "

## 2021-01-16 ENCOUNTER — IMMUNIZATION (OUTPATIENT)
Dept: VACCINE CLINIC | Facility: HOSPITAL | Age: 81
End: 2021-01-16

## 2021-01-16 PROCEDURE — 91301 HC SARSCO02 VAC 100MCG/0.5ML IM: CPT | Performed by: OBSTETRICS & GYNECOLOGY

## 2021-01-16 PROCEDURE — 0011A: CPT | Performed by: OBSTETRICS & GYNECOLOGY

## 2021-01-18 ENCOUNTER — APPOINTMENT (OUTPATIENT)
Dept: VACCINE CLINIC | Facility: HOSPITAL | Age: 81
End: 2021-01-18

## 2021-01-28 ENCOUNTER — TELEPHONE (OUTPATIENT)
Dept: GASTROENTEROLOGY | Facility: CLINIC | Age: 81
End: 2021-01-28

## 2021-02-13 ENCOUNTER — IMMUNIZATION (OUTPATIENT)
Dept: VACCINE CLINIC | Facility: HOSPITAL | Age: 81
End: 2021-02-13

## 2021-02-13 PROCEDURE — 91301 HC SARSCO02 VAC 100MCG/0.5ML IM: CPT | Performed by: OBSTETRICS & GYNECOLOGY

## 2021-02-13 PROCEDURE — 0012A: CPT | Performed by: OBSTETRICS & GYNECOLOGY

## 2021-03-31 ENCOUNTER — TELEPHONE (OUTPATIENT)
Dept: GASTROENTEROLOGY | Facility: CLINIC | Age: 81
End: 2021-03-31

## 2021-04-26 ENCOUNTER — OFFICE VISIT (OUTPATIENT)
Dept: GASTROENTEROLOGY | Facility: CLINIC | Age: 81
End: 2021-04-26

## 2021-04-26 VITALS
SYSTOLIC BLOOD PRESSURE: 122 MMHG | HEART RATE: 74 BPM | DIASTOLIC BLOOD PRESSURE: 68 MMHG | BODY MASS INDEX: 19.46 KG/M2 | WEIGHT: 114 LBS | HEIGHT: 64 IN | TEMPERATURE: 97.7 F | RESPIRATION RATE: 16 BRPM

## 2021-04-26 DIAGNOSIS — R13.19 ESOPHAGEAL DYSPHAGIA: Primary | ICD-10-CM

## 2021-04-26 DIAGNOSIS — Z93.1 G TUBE FEEDINGS (HCC): ICD-10-CM

## 2021-04-26 PROCEDURE — 99213 OFFICE O/P EST LOW 20 MIN: CPT | Performed by: INTERNAL MEDICINE

## 2021-04-26 NOTE — PROGRESS NOTES
[facility] Gastroenterology - Office note  4/26/2021    Olive Bowman 1940     Chief Complaint   Patient presents with   • GI Problem     Doing good since feeding tube out       HISTORY OF PRESENT ILLNESS    Olive Bowman is a 80 y.o. female who presents for follow-up.  G-tube was removed by Dr. Doreen White.  Her wound seems to be healing.  The drainage has decreased.  She is able to maintain her weight.  No other complaints.  Last colonoscopy was in 2013 for diarrhea.  Collagenous colitis.  Otherwise normal.    Past Medical History:   Diagnosis Date   • Arthralgia    • Dyspepsia 9/5/2018    she is stable at this time. she is to take Gaviscon as needed and Omeprazole if persistent for more than a few weeks.    • Fibromyalgia    • Lichen planus    • Mouth ulcers 11/18/2016   • Osteoporosis    • Primary osteoarthritis of both hands    • Sicca (CMS/HCC)    • Ulcerative colitis (CMS/HCC)    • Vitamin D deficiency    • Xerostomia 11/19/2016       Past Surgical History:   Procedure Laterality Date   • BREAST EXCISIONAL BIOPSY Right     x 2 benign   • BREAST EXCISIONAL BIOPSY Left     x 1 benign   • BREAST LUMPECTOMY     • CHOLECYSTECTOMY     • COLONOSCOPY W/ BIOPSIES  05/22/2013    Changes consistent with collagenous colitis   • ENDOSCOPY  02/04/2015    Dilated normal exam   • ENDOSCOPY N/A 6/7/2019    Benign esophageal stenosis   • GASTROSTOMY FEEDING TUBE INSERTION N/A 6/20/2019    Procedure: GASTROSTOMY FEEDING TUBE INSERTION;  Surgeon: Doreen White MD;  Location: [facility];  Service: General   • HYSTERECTOMY           Current Outpatient Medications:   •  ALPRAZolam (XANAX) 1 MG tablet, Take 1 mg by mouth 2 (Two) Times a Day As Needed for Anxiety., Disp: , Rfl:   •  aluminum hydroxide-magnesium carbonate (GAVISCON)  MG/15ML suspension oral suspension, Take 15 mL by mouth Every Night. Mix with 10ml lidocaine viscus, Disp: , Rfl:   •  Cholecalciferol (VITAMIN D3) 5000 units capsule capsule,  Take 5,000 Units by mouth 4 (Four) Times a Week. , Monday, Wednesday, Friday., Disp: , Rfl:   •  estradiol (ESTRACE) 1 MG tablet, Take 1 mg by mouth Daily., Disp: , Rfl:   •  HYDROcodone-acetaminophen (NORCO)  MG per tablet, Take 1 tablet by mouth Every 6 (Six) Hours As Needed for Moderate Pain ., Disp: , Rfl:   •  latanoprost (XALATAN) 0.005 % ophthalmic solution, Administer 1 drop to both eyes Every Night., Disp: , Rfl:   •  lidocaine viscous (XYLOCAINE) 2 % solution, Take 10 mL by mouth Every Night. Mix with Gaviscon., Disp: , Rfl:   •  Miracle mouthwash oral suspension, Swish and spit 15 mL 3 (Three) Times a Day As Needed (mouth sores)., Disp: , Rfl:   •  orphenadrine (NORFLEX) 100 MG 12 hr tablet, Take 100 mg by mouth 2 (Two) Times a Day., Disp: , Rfl:   •  Probiotic Product (PROBIOTIC PO), Take 1 capsule by mouth Daily., Disp: , Rfl:   •  silver sulfadiazine (SILVADENE, SSD) 1 % cream, Apply  topically to the appropriate area as directed 2 (Two) Times a Day., Disp: 50 g, Rfl: 2  •  triamcinolone (KENALOG) 0.1 % cream, Apply  topically to the appropriate area as directed Every 12 (Twelve) Hours. For Lichen planus, Disp: 453.6 g, Rfl: 2  •  vitamin B-12 (CYANOCOBALAMIN) 500 MCG tablet, Take 1,000 mcg by mouth Daily., Disp: , Rfl:   •  vitamin E 1000 UNIT capsule, Take 1,000 Units by mouth Daily., Disp: , Rfl:     Allergies   Allergen Reactions   • Azathioprine Other (See Comments)     High fever, rash, oral lesions   • Latex        Social History     Socioeconomic History   • Marital status:      Spouse name: Not on file   • Number of children: Not on file   • Years of education: Not on file   • Highest education level: Not on file   Tobacco Use   • Smoking status: Former Smoker     Quit date:      Years since quittin.3   • Smokeless tobacco: Never Used   Substance and Sexual Activity   • Alcohol use: Yes     Alcohol/week: 4.0 standard drinks     Types: 4 Glasses of wine per week   •  "Drug use: Defer   • Sexual activity: Defer       Family History   Problem Relation Age of Onset   • Cancer Mother    • Diabetes Paternal Grandmother    • Diabetes Paternal Grandfather    • Breast cancer Neg Hx    • Colon cancer Neg Hx    • Colon polyps Neg Hx        Review of Systems   Respiratory: Negative.    Cardiovascular: Negative.    Gastrointestinal: Negative.        Vitals:    04/26/21 1402   BP: 122/68   Pulse: 74   Resp: 16   Temp: 97.7 °F (36.5 °C)   Weight: 51.7 kg (114 lb)   Height: 162.6 cm (64\")    Body mass index is 19.57 kg/m².    Physical Exam  Abdominal:      Comments: G-tube site healing and without drainage               ASSESSMENT AND PLAN      1. Esophageal dysphagia  Tolerating soft diet without difficulty.  Able to maintain weight    2. G tube feedings (CMS/HCC)  G-tube site healing in without drainage or significant erythema       EMR Dragon/transcription disclaimer: Much of this encounter note is an electronic transcription/translation of spoken language to printed text.  The electronic translation of spoken language may permit erroneous, or at times, nonsensical words or phrases to be inadvertently transcribed.  Although I have reviewed the note for such errors, some may still exist.    Kun Cavanaugh MD  4/26/2021  14:42 CDT  "

## 2021-05-13 ENCOUNTER — TELEPHONE (OUTPATIENT)
Dept: GASTROENTEROLOGY | Facility: CLINIC | Age: 81
End: 2021-05-13

## 2021-05-13 NOTE — TELEPHONE ENCOUNTER
Patient called stating her peg tube site is still leaking and draining if she bends over or coughs or even moves around wants to know what to do ?

## 2021-08-24 ENCOUNTER — TRANSCRIBE ORDERS (OUTPATIENT)
Dept: ADMINISTRATIVE | Facility: HOSPITAL | Age: 81
End: 2021-08-24

## 2021-08-24 DIAGNOSIS — Z12.31 ENCOUNTER FOR SCREENING MAMMOGRAM FOR MALIGNANT NEOPLASM OF BREAST: Primary | ICD-10-CM

## 2021-09-10 ENCOUNTER — APPOINTMENT (OUTPATIENT)
Dept: MAMMOGRAPHY | Facility: HOSPITAL | Age: 81
End: 2021-09-10

## 2021-10-06 ENCOUNTER — APPOINTMENT (OUTPATIENT)
Dept: MAMMOGRAPHY | Facility: HOSPITAL | Age: 81
End: 2021-10-06

## 2021-10-13 ENCOUNTER — HOSPITAL ENCOUNTER (OUTPATIENT)
Dept: MAMMOGRAPHY | Facility: HOSPITAL | Age: 81
Discharge: HOME | End: 2021-10-13
Admitting: INTERNAL MEDICINE

## 2021-10-13 DIAGNOSIS — Z12.31 ENCOUNTER FOR SCREENING MAMMOGRAM FOR MALIGNANT NEOPLASM OF BREAST: ICD-10-CM

## 2021-10-13 PROCEDURE — 77063 BREAST TOMOSYNTHESIS BI: CPT

## 2021-10-13 PROCEDURE — 77067 SCR MAMMO BI INCL CAD: CPT

## 2021-10-14 ENCOUNTER — APPOINTMENT (OUTPATIENT)
Dept: MAMMOGRAPHY | Facility: HOSPITAL | Age: 81
End: 2021-10-14

## 2021-10-15 ENCOUNTER — HOSPITAL ENCOUNTER (OUTPATIENT)
Dept: MAMMOGRAPHY | Facility: HOSPITAL | Age: 81
Discharge: HOME | End: 2021-10-15

## 2021-10-15 ENCOUNTER — HOSPITAL ENCOUNTER (OUTPATIENT)
Dept: ULTRASOUND IMAGING | Facility: HOSPITAL | Age: 81
Discharge: HOME | End: 2021-10-15

## 2021-10-15 DIAGNOSIS — R92.8 ABNORMAL MAMMOGRAM: ICD-10-CM

## 2021-10-15 PROCEDURE — 77066 DX MAMMO INCL CAD BI: CPT

## 2021-10-15 PROCEDURE — G0279 TOMOSYNTHESIS, MAMMO: HCPCS

## 2021-10-15 PROCEDURE — 76642 ULTRASOUND BREAST LIMITED: CPT

## 2021-12-18 DIAGNOSIS — R19.7 DIARRHEA, UNSPECIFIED TYPE: Primary | ICD-10-CM

## 2021-12-18 RX ORDER — ONDANSETRON 4 MG/1
4 TABLET, ORALLY DISINTEGRATING ORAL 3 TIMES DAILY PRN
Qty: 21 TABLET | Refills: 0 | Status: SHIPPED | OUTPATIENT
Start: 2021-12-18

## 2021-12-18 RX ORDER — DIPHENOXYLATE HYDROCHLORIDE AND ATROPINE SULFATE 2.5; .025 MG/1; MG/1
1 TABLET ORAL 4 TIMES DAILY PRN
Qty: 40 TABLET | Refills: 0 | Status: SHIPPED | OUTPATIENT
Start: 2021-12-18 | End: 2021-12-28

## 2022-08-24 ENCOUNTER — TRANSCRIBE ORDERS (OUTPATIENT)
Dept: ADMINISTRATIVE | Facility: HOSPITAL | Age: 82
End: 2022-08-24

## 2022-08-24 DIAGNOSIS — Z78.0 POSTMENOPAUSAL STATUS: Primary | ICD-10-CM

## 2022-08-24 DIAGNOSIS — Z12.31 ENCOUNTER FOR SCREENING MAMMOGRAM FOR MALIGNANT NEOPLASM OF BREAST: ICD-10-CM

## 2022-10-17 ENCOUNTER — HOSPITAL ENCOUNTER (OUTPATIENT)
Dept: BONE DENSITY | Facility: HOSPITAL | Age: 82
Discharge: HOME | End: 2022-10-17

## 2022-10-17 ENCOUNTER — HOSPITAL ENCOUNTER (OUTPATIENT)
Dept: MAMMOGRAPHY | Facility: HOSPITAL | Age: 82
Discharge: HOME | End: 2022-10-17

## 2022-10-17 DIAGNOSIS — Z12.31 ENCOUNTER FOR SCREENING MAMMOGRAM FOR MALIGNANT NEOPLASM OF BREAST: ICD-10-CM

## 2022-10-17 DIAGNOSIS — Z78.0 POSTMENOPAUSAL STATUS: ICD-10-CM

## 2022-10-17 PROCEDURE — 77067 SCR MAMMO BI INCL CAD: CPT

## 2022-10-17 PROCEDURE — 77080 DXA BONE DENSITY AXIAL: CPT

## 2022-10-17 PROCEDURE — 77063 BREAST TOMOSYNTHESIS BI: CPT

## 2022-10-18 ENCOUNTER — HOSPITAL ENCOUNTER (OUTPATIENT)
Dept: ULTRASOUND IMAGING | Facility: HOSPITAL | Age: 82
Discharge: HOME | End: 2022-10-18
Admitting: INTERNAL MEDICINE

## 2022-10-18 DIAGNOSIS — R92.8 ABNORMAL MAMMOGRAM: ICD-10-CM

## 2022-10-18 PROCEDURE — 76642 ULTRASOUND BREAST LIMITED: CPT

## 2023-04-29 NOTE — PROGRESS NOTES
Doreen White MD [facility] History and Physical     Referring Provider: Venkat Hairston MD      Chief complaint   Chief Complaint   Patient presents with   • Hernia     Mrs. Tiwari is here for a new problem for a hernia.         Subjective .     History of present illness:  Olive Bowman is a 82 y.o. female who who has a history of open gastrostomy tube placement and then subsequent removal who presents complaining of acute onset of pain at the G tube site.  She states that she had been taking care of her recently   and she he to lift him.  She states that she had acute onset of pain.  She denies any associated fevers, chills, nausea, vomiting, bulge suggestive of herniation, or change in bowel habits.  She now states that the pain has improved.    History    Past Medical History:   Diagnosis Date   • Arthralgia    • Dyspepsia 2018    she is stable at this time. she is to take Gaviscon as needed and Omeprazole if persistent for more than a few weeks.    • Fibromyalgia    • Lichen planus    • Mouth ulcers 2016   • Osteoporosis    • Primary osteoarthritis of both hands    • Sicca    • Ulcerative colitis    • Vitamin D deficiency    • Xerostomia 2016   ,   Past Surgical History:   Procedure Laterality Date   • BREAST EXCISIONAL BIOPSY Right     x 2 benign   • BREAST EXCISIONAL BIOPSY Left     x 1 benign   • CHOLECYSTECTOMY     • COLONOSCOPY W/ BIOPSIES  2013    Changes consistent with collagenous colitis   • ENDOSCOPY  2015    Dilated normal exam   • ENDOSCOPY N/A 2019    Benign esophageal stenosis   • GASTROSTOMY FEEDING TUBE INSERTION N/A 2019    Procedure: GASTROSTOMY FEEDING TUBE INSERTION;  Surgeon: Doreen White MD;  Location: [facility];  Service: General   • HYSTERECTOMY     ,   Family History   Problem Relation Age of Onset   • Cancer Mother    • Diabetes Paternal Grandmother    • Diabetes Paternal Grandfather    • Breast cancer Neg Hx    • Colon  cancer Neg Hx    • Colon polyps Neg Hx    ,   Social History     Tobacco Use   • Smoking status: Former     Types: Cigarettes     Quit date:      Years since quittin.3   • Smokeless tobacco: Never   Substance Use Topics   • Alcohol use: Yes     Alcohol/week: 4.0 standard drinks     Types: 4 Glasses of wine per week   • Drug use: Defer   , (Not in a hospital admission)   and Allergies:  Azathioprine and Latex    Current Outpatient Medications:   •  ALPRAZolam (XANAX) 1 MG tablet, Take 1 tablet by mouth 2 (Two) Times a Day As Needed for Anxiety., Disp: , Rfl:   •  aluminum hydroxide-magnesium carbonate (GAVISCON)  MG/15ML suspension oral suspension, Take 15 mL by mouth Every Night. Mix with 10ml lidocaine viscus, Disp: , Rfl:   •  Cholecalciferol (VITAMIN D3) 5000 units capsule capsule, Take 1 capsule by mouth 4 (Four) Times a Week. , Monday, Wednesday, Friday., Disp: , Rfl:   •  estradiol (ESTRACE) 1 MG tablet, Take 1 tablet by mouth Daily., Disp: , Rfl:   •  HYDROcodone-acetaminophen (NORCO)  MG per tablet, Take 1 tablet by mouth Every 6 (Six) Hours As Needed for Moderate Pain., Disp: , Rfl:   •  latanoprost (XALATAN) 0.005 % ophthalmic solution, Administer 1 drop to both eyes Every Night., Disp: , Rfl:   •  lidocaine viscous (XYLOCAINE) 2 % solution, Take 10 mL by mouth Every Night. Mix with Gaviscon., Disp: , Rfl:   •  Miracle mouthwash oral suspension, Swish and spit 15 mL 3 (Three) Times a Day As Needed (mouth sores)., Disp: , Rfl:   •  orphenadrine (NORFLEX) 100 MG 12 hr tablet, Take 1 tablet by mouth 2 (Two) Times a Day., Disp: , Rfl:   •  Probiotic Product (PROBIOTIC PO), Take 1 capsule by mouth Daily., Disp: , Rfl:   •  silver sulfadiazine (SILVADENE, SSD) 1 % cream, Apply  topically to the appropriate area as directed 2 (Two) Times a Day., Disp: 50 g, Rfl: 2  •  triamcinolone (KENALOG) 0.1 % cream, Apply  topically to the appropriate area as directed Every 12 (Twelve) Hours. For  "Lichen planus, Disp: 453.6 g, Rfl: 2  •  vitamin B-12 (CYANOCOBALAMIN) 500 MCG tablet, Take 2 tablets by mouth Daily., Disp: , Rfl:   •  vitamin E 1000 UNIT capsule, Take 1 capsule by mouth Daily., Disp: , Rfl:     Review of Systems:    All organ systems were evaluated and found negative except those which are mentioned in the History of Present Illness.    Objective     Physical Exam:    Vital Signs   /76 (BP Location: Left arm, Patient Position: Sitting, Cuff Size: Adult)   Pulse 86   Ht 162.6 cm (64\")   Wt 52.2 kg (115 lb)   SpO2 99%   BMI 19.74 kg/m²        Constitutional:    Well-developed, well-nourished in no acute distress  Eyes:     Extraocular movements intact; pupils equal, round, and reactive  Ears, Nose, Mouth, Throat:  Hearing intact, nose midline, no mucosal lesions  Cardiovascular:   Regular rate and rhythm   Respiratory:    Clear to auscultation bilaterally  Gastrointestinal:   Soft, nontender, nondistended, no masses, bowel sounds intact, no herniation with or without valsalva  Genitourinary:    Deferred  Musculoskeletal:   Full range of motion, no muscle wasting, no weakness  Skin:     No rashes or excoriations  Neurological:    Moves all extremities, sensation intact  Psychiatric:    Alert and oriented to person, place, and time  Hematologic/Lymphatic/Immune: No lymphadenopathy       Results Review:    BMI is within normal parameters. No other follow-up for BMI required.    Assessment & Plan       Diagnoses and all orders for this visit:    1. Left upper quadrant abdominal pain (Primary)    2. History of gastrostomy           The patient had acute pain after heavy lifting.  She now states that the pain has improved with rest.  Clinically, there is no evidence of herniation.  She was instructed to slowly resume activities.  She will return with recurrence of her pain or if she is able to palpate a hernia.      An extensive review of patient intake forms, referring physician documents, " laboratories, and imaging was performed in the medical decision making and surgical planning of this patient.            Doreen White MD

## 2023-05-01 ENCOUNTER — OFFICE VISIT (OUTPATIENT)
Dept: SURGERY | Facility: CLINIC | Age: 83
End: 2023-05-01
Payer: MEDICARE

## 2023-05-01 VITALS
OXYGEN SATURATION: 99 % | DIASTOLIC BLOOD PRESSURE: 76 MMHG | HEIGHT: 64 IN | WEIGHT: 115 LBS | HEART RATE: 86 BPM | BODY MASS INDEX: 19.63 KG/M2 | SYSTOLIC BLOOD PRESSURE: 115 MMHG

## 2023-05-01 DIAGNOSIS — Z98.890 HISTORY OF GASTROSTOMY: ICD-10-CM

## 2023-05-01 DIAGNOSIS — R10.12 LEFT UPPER QUADRANT ABDOMINAL PAIN: Primary | ICD-10-CM

## 2023-07-12 PROBLEM — D17.24 LIPOMA OF LEFT LOWER EXTREMITY: Status: ACTIVE | Noted: 2023-07-12

## 2023-07-23 NOTE — PROGRESS NOTES
"Doreen White MD [facility] Progress Note    Referring Provider: No ref. provider found      Chief complaint   Chief Complaint   Patient presents with    Post-op        Subjective .     History of present illness:  Olive Bowman  is a 82 y.o. female who presents status post Excision lipoma left thigh, 2cm .    History    The following portions of the patient's history were reviewed and updated as appropriate: allergies, current medications, past family history, past medical history, past social history, past surgical history, and problem list.    Objective     Vital Signs   BP 92/58   Pulse 84   Ht 160 cm (63\")   Wt 52.2 kg (115 lb)   SpO2 96%   BMI 20.37 kg/m²      Physical Exam:    General The patient is well-developed, well-nourished, and in no acute distress.    Left thigh The incision is well-approximated and healing without signs of infection.       BMI is within normal parameters. No other follow-up for BMI required.    Assessment & Plan       Diagnoses and all orders for this visit:    1. Postoperative visit (Primary)    2. Aftercare following surgery    3. S/P excision of lipoma           The patient will return prn.      Doreen White MD              "

## 2023-07-24 ENCOUNTER — OFFICE VISIT (OUTPATIENT)
Dept: SURGERY | Facility: CLINIC | Age: 83
End: 2023-07-24
Payer: MEDICARE

## 2023-07-24 VITALS
OXYGEN SATURATION: 96 % | SYSTOLIC BLOOD PRESSURE: 92 MMHG | HEART RATE: 84 BPM | WEIGHT: 115 LBS | HEIGHT: 63 IN | DIASTOLIC BLOOD PRESSURE: 58 MMHG | BODY MASS INDEX: 20.38 KG/M2

## 2023-07-24 DIAGNOSIS — Z48.89 POSTOPERATIVE VISIT: Primary | ICD-10-CM

## 2023-07-24 DIAGNOSIS — Z98.890 S/P EXCISION OF LIPOMA: ICD-10-CM

## 2023-07-24 DIAGNOSIS — Z86.018 S/P EXCISION OF LIPOMA: ICD-10-CM

## 2023-07-24 DIAGNOSIS — Z48.89 AFTERCARE FOLLOWING SURGERY: ICD-10-CM

## 2023-07-24 PROCEDURE — 1159F MED LIST DOCD IN RCRD: CPT | Performed by: SPECIALIST

## 2023-07-24 PROCEDURE — 1160F RVW MEDS BY RX/DR IN RCRD: CPT | Performed by: SPECIALIST

## 2023-07-24 PROCEDURE — 99024 POSTOP FOLLOW-UP VISIT: CPT | Performed by: SPECIALIST

## 2023-08-09 ENCOUNTER — HOSPITAL ENCOUNTER (OUTPATIENT)
Dept: GENERAL RADIOLOGY | Facility: HOSPITAL | Age: 83
Discharge: HOME | End: 2023-08-09
Payer: MEDICARE

## 2023-08-09 ENCOUNTER — TRANSCRIBE ORDERS (OUTPATIENT)
Dept: ADMINISTRATIVE | Facility: HOSPITAL | Age: 83
End: 2023-08-09
Payer: MEDICARE

## 2023-08-09 DIAGNOSIS — M25.571 PAIN IN RIGHT ANKLE AND JOINTS OF RIGHT FOOT: ICD-10-CM

## 2023-08-09 DIAGNOSIS — M25.571 PAIN IN RIGHT ANKLE AND JOINTS OF RIGHT FOOT: Primary | ICD-10-CM

## 2023-08-09 PROCEDURE — 73610 X-RAY EXAM OF ANKLE: CPT

## 2023-08-09 PROCEDURE — 73630 X-RAY EXAM OF FOOT: CPT

## 2023-12-13 ENCOUNTER — TRANSCRIBE ORDERS (OUTPATIENT)
Dept: ADMINISTRATIVE | Facility: HOSPITAL | Age: 83
End: 2023-12-13
Payer: MEDICARE

## 2023-12-13 DIAGNOSIS — Z12.31 ENCOUNTER FOR SCREENING MAMMOGRAM FOR MALIGNANT NEOPLASM OF BREAST: Primary | ICD-10-CM

## 2023-12-19 LAB
NCCN CRITERIA FLAG: ABNORMAL
TYRER CUZICK SCORE: 0.9

## 2023-12-29 ENCOUNTER — HOSPITAL ENCOUNTER (OUTPATIENT)
Dept: MAMMOGRAPHY | Facility: HOSPITAL | Age: 83
Discharge: HOME | End: 2023-12-29
Admitting: INTERNAL MEDICINE
Payer: MEDICARE

## 2023-12-29 DIAGNOSIS — Z12.31 ENCOUNTER FOR SCREENING MAMMOGRAM FOR MALIGNANT NEOPLASM OF BREAST: ICD-10-CM

## 2023-12-29 PROCEDURE — 77063 BREAST TOMOSYNTHESIS BI: CPT

## 2023-12-29 PROCEDURE — 77067 SCR MAMMO BI INCL CAD: CPT

## 2024-02-12 RX ORDER — MECLIZINE HCL 12.5 MG/1
12.5 TABLET ORAL 3 TIMES DAILY PRN
Qty: 90 TABLET | Refills: 0 | Status: SHIPPED | OUTPATIENT
Start: 2024-02-12 | End: 2024-03-13

## 2024-06-03 ENCOUNTER — OFFICE VISIT (OUTPATIENT)
Dept: GASTROENTEROLOGY | Facility: CLINIC | Age: 84
End: 2024-06-03
Payer: MEDICARE

## 2024-06-03 VITALS
TEMPERATURE: 95.5 F | OXYGEN SATURATION: 99 % | WEIGHT: 118.2 LBS | BODY MASS INDEX: 20.94 KG/M2 | HEART RATE: 93 BPM | SYSTOLIC BLOOD PRESSURE: 110 MMHG | HEIGHT: 63 IN | DIASTOLIC BLOOD PRESSURE: 78 MMHG

## 2024-06-03 DIAGNOSIS — Z78.9 NONSMOKER: ICD-10-CM

## 2024-06-03 DIAGNOSIS — R13.19 ESOPHAGEAL DYSPHAGIA: Primary | ICD-10-CM

## 2024-06-03 DIAGNOSIS — K22.2 ESOPHAGEAL STRICTURE: ICD-10-CM

## 2024-06-03 DIAGNOSIS — R10.13 DYSPEPSIA: ICD-10-CM

## 2024-06-03 PROCEDURE — 1160F RVW MEDS BY RX/DR IN RCRD: CPT | Performed by: CLINICAL NURSE SPECIALIST

## 2024-06-03 PROCEDURE — 1159F MED LIST DOCD IN RCRD: CPT | Performed by: CLINICAL NURSE SPECIALIST

## 2024-06-03 PROCEDURE — 99203 OFFICE O/P NEW LOW 30 MIN: CPT | Performed by: CLINICAL NURSE SPECIALIST

## 2024-06-03 NOTE — H&P (VIEW-ONLY)
Olive Bowman  1940    6/3/2024  Chief Complaint   Patient presents with    GI Problem     Trouble swallowing-feels like a knot where feeding tube was 3 years ago     Subjective   HPI  Olive Bowman is a 83 y.o. female who presents with a complaint of difficulty swallowing. Recurrent ongoing for her for many years. At one time she had Gtube placed by general surgery removed in 2021. She has had dilatation in the past last time with Aldo.  It was removed when her weight got up to 110 pounds. Today 118 lbs.   She is having some intermittent mild dysphagia with solid foods distal esophageal region. Dry foods or meat is a trigger. She had good results after dilation in 2019. She has reflux ongoing persistent. She is on Gaviscon and this manages well for her. No nausea or vomiting.     Past Medical History:   Diagnosis Date    Arthralgia     Bronchitis     Dyspepsia 09/05/2018    she is stable at this time. she is to take Gaviscon as needed and Omeprazole if persistent for more than a few weeks.     Esophageal varices     Charted    Fibromyalgia     GERD (gastroesophageal reflux disease)     Glaucoma     History of transfusion     Kidney stones     Lichen planus     Mouth ulcers 11/18/2016    Osteoporosis     Primary osteoarthritis of both hands     Sicca     Sleep apnea     c-pap with O2    Ulcerative colitis     UTI (urinary tract infection)     Vitamin D deficiency     Xerostomia 11/19/2016     Past Surgical History:   Procedure Laterality Date    BREAST EXCISIONAL BIOPSY Right     x 2 benign    BREAST EXCISIONAL BIOPSY Left     x 1 benign    CATARACT EXTRACTION, BILATERAL      CHOLECYSTECTOMY      COLONOSCOPY      Charted    COLONOSCOPY W/ BIOPSIES  05/22/2013    Changes consistent with collagenous colitis    ENDOSCOPY  02/04/2015    Dilated normal exam    ENDOSCOPY N/A 06/07/2019    Benign esophageal stenosis    ENDOSCOPY  07/23/2019    Dr Carlson, dilation performed with a Annmarie dilator 52 Fr. exam  otherwise normal.    GASTROSTOMY FEEDING TUBE INSERTION N/A 06/20/2019    Procedure: GASTROSTOMY FEEDING TUBE INSERTION;  Surgeon: Doreen White MD;  Location:  [facility] OR;  Service: General    GASTROSTOMY TUBE REVISION      removal    HYSTERECTOMY      LEG EXCISION LESION/CYST Left 07/14/2023    Procedure: LEFT LIPOMA EXCISION LOWER EXTREMITY;  Surgeon: Doreen White MD;  Location:  [facility] OR;  Service: General;  Laterality: Left;    UPPER GASTROINTESTINAL ENDOSCOPY         Outpatient Medications Marked as Taking for the 6/3/24 encounter (Office Visit) with Neela Hurst APRN   Medication Sig Dispense Refill    ALPRAZolam (XANAX) 1 MG tablet Take 1 tablet by mouth 2 (Two) Times a Day As Needed for Anxiety.      aluminum hydroxide-magnesium carbonate (GAVISCON)  MG/15ML suspension oral suspension Take 15 mL by mouth Every Night. Mix with 10ml lidocaine viscus      Cholecalciferol (VITAMIN D3) 5000 units capsule capsule Take 1 capsule by mouth Daily.      HYDROcodone-acetaminophen (NORCO)  MG per tablet Take 1 tablet by mouth Every 6 (Six) Hours As Needed for Moderate Pain.      lidocaine viscous (XYLOCAINE) 2 % solution Take 10 mL by mouth Every Night. Mix with Gaviscon.      Miracle mouthwash oral suspension Swish and spit 15 mL 3 (Three) Times a Day As Needed (mouth sores).      orphenadrine (NORFLEX) 100 MG 12 hr tablet Take 1 tablet by mouth 2 (Two) Times a Day As Needed.      Probiotic Product (PROBIOTIC PO) Take 1 capsule by mouth Daily.      [DISCONTINUED] vitamin B-12 (CYANOCOBALAMIN) 500 MCG tablet Take 2 tablets by mouth Daily.      [DISCONTINUED] vitamin E 1000 UNIT capsule Take 1 capsule by mouth Daily.       Allergies   Allergen Reactions    Azathioprine Other (See Comments)     High fever, rash, oral lesions    Latex Rash    Adhesive Tape Itching     Social History     Socioeconomic History    Marital status:    Tobacco Use    Smoking status: Former     Current  packs/day: 0.00     Types: Cigarettes     Quit date:      Years since quittin.4    Smokeless tobacco: Never   Vaping Use    Vaping status: Never Used   Substance and Sexual Activity    Alcohol use: Yes     Alcohol/week: 1.0 standard drink of alcohol     Types: 1 Glasses of wine per week     Comment: rarely    Drug use: Never    Sexual activity: Not Currently     Family History   Problem Relation Age of Onset    Cancer Mother     Diabetes Paternal Grandmother     Diabetes Paternal Grandfather     Breast cancer Neg Hx     Colon cancer Neg Hx     Colon polyps Neg Hx      Health Maintenance   Topic Date Due    TDAP/TD VACCINES (1 - Tdap) Never done    RSV Vaccine - Adults (1 - 1-dose 60+ series) Never done    Pneumococcal Vaccine 65+ (1 of 1 - PCV) Never done    ANNUAL WELLNESS VISIT  2020    COLORECTAL CANCER SCREENING  2023    COVID-19 Vaccine (2023- season) 2023    INFLUENZA VACCINE  2024    DXA SCAN  10/17/2024    ZOSTER VACCINE  Completed     Review of Systems   Constitutional:  Negative for activity change, appetite change, chills, diaphoresis, fatigue, fever and unexpected weight change.   HENT:  Positive for trouble swallowing. Negative for ear pain, hearing loss, mouth sores, sore throat and voice change.    Eyes: Negative.    Respiratory:  Negative for cough, choking, shortness of breath and wheezing.    Cardiovascular:  Negative for chest pain and palpitations.   Gastrointestinal:  Negative for abdominal pain, blood in stool, constipation, diarrhea, nausea and vomiting.   Endocrine: Negative for cold intolerance and heat intolerance.   Genitourinary:  Negative for decreased urine volume, dysuria, frequency, hematuria and urgency.   Musculoskeletal:  Negative for back pain, gait problem and myalgias.   Skin:  Negative for color change, pallor and rash.   Allergic/Immunologic: Negative for food allergies and immunocompromised state.   Neurological:  Negative for dizziness,  "tremors, seizures, syncope, weakness, light-headedness, numbness and headaches.   Hematological:  Negative for adenopathy. Does not bruise/bleed easily.   Psychiatric/Behavioral:  Negative for agitation and confusion. The patient is not nervous/anxious.    All other systems reviewed and are negative.    Objective   Vitals:    06/03/24 0928   BP: 110/78   BP Location: Left arm   Pulse: 93   Temp: 95.5 °F (35.3 °C)   TempSrc: Temporal   SpO2: 99%   Weight: 53.6 kg (118 lb 3.2 oz)   Height: 160 cm (62.99\")     Body mass index is 20.94 kg/m².  Physical Exam  Constitutional:       Appearance: She is well-developed.   HENT:      Head: Normocephalic and atraumatic.   Eyes:      Pupils: Pupils are equal, round, and reactive to light.   Neck:      Trachea: No tracheal deviation.   Cardiovascular:      Rate and Rhythm: Normal rate and regular rhythm.      Heart sounds: Normal heart sounds. No murmur heard.     No friction rub. No gallop.   Pulmonary:      Effort: Pulmonary effort is normal. No respiratory distress.      Breath sounds: Normal breath sounds. No wheezing or rales.   Chest:      Chest wall: No tenderness.   Abdominal:      General: Bowel sounds are normal. There is no distension.      Palpations: Abdomen is soft. Abdomen is not rigid.      Tenderness: There is no abdominal tenderness. There is no guarding or rebound.   Musculoskeletal:         General: No tenderness or deformity. Normal range of motion.      Cervical back: Normal range of motion and neck supple.   Skin:     General: Skin is warm and dry.      Coloration: Skin is not pale.      Findings: No rash.   Neurological:      Mental Status: She is alert and oriented to person, place, and time.      Deep Tendon Reflexes: Reflexes are normal and symmetric.   Psychiatric:         Behavior: Behavior normal.         Thought Content: Thought content normal.         Judgment: Judgment normal.       Assessment & Plan   Diagnoses and all orders for this " visit:    1. Esophageal dysphagia (Primary)    2. Nonsmoker    3. Dyspepsia    4. Esophageal stricture    Will discuss with Dr Doll before scheduling here. She may need to go back to Dr Goodson.   I discussed non pharmaceutical treatment of gerd.  This includes gradually losing weight to achieve ideal body wt., elevation of the head of bed by 4-6 inches, nothing to eat or drink 3 hours prior to lying down, avoiding tight clothing, stress reduction, tobacco cessation, reduction of alcohol intake, and dietary restrictions (avoiding caffeine, coffee, fatty foods, mints, chocolate, spicy foods and tomato based sauces as much as possible).  Continue Gaviscon    * Surgery not found *  Part of this note may be an electronic transcription/translation of spoken language to printed text using the Dragon Dictation System.  Body mass index is 20.94 kg/m².  Return if symptoms worsen or fail to improve.    BMI is within normal parameters. No other follow-up for BMI required.      All risks, benefits, alternatives, and indications of colonoscopy and/or Endoscopy procedure have been discussed with the patient. Risks to include perforation of the colon requiring possible surgery or colostomy, risk of bleeding from biopsies or removal of colon tissue, possibility of missing a colon polyp or cancer, or adverse drug reaction.  Benefits to include the diagnosis and management of disease of the colon and rectum. Alternatives to include barium enema, radiographic evaluation, lab testing or no intervention. Pt verbalizes understanding and agrees.     Neela Hurst, APRN  6/3/2024  10:10 CDT          If you smoke or use tobacco, 4 minutes reading provided  Steps to Quit Smoking  Smoking tobacco can be harmful to your health and can affect almost every organ in your body. Smoking puts you, and those around you, at risk for developing many serious chronic diseases. Quitting smoking is difficult, but it is one of the best things that  you can do for your health. It is never too late to quit.  What are the benefits of quitting smoking?  When you quit smoking, you lower your risk of developing serious diseases and conditions, such as:  Lung cancer or lung disease, such as COPD.  Heart disease.  Stroke.  Heart attack.  Infertility.  Osteoporosis and bone fractures.  Additionally, symptoms such as coughing, wheezing, and shortness of breath may get better when you quit. You may also find that you get sick less often because your body is stronger at fighting off colds and infections. If you are pregnant, quitting smoking can help to reduce your chances of having a baby of low birth weight.  How do I get ready to quit?  When you decide to quit smoking, create a plan to make sure that you are successful. Before you quit:  Pick a date to quit. Set a date within the next two weeks to give you time to prepare.  Write down the reasons why you are quitting. Keep this list in places where you will see it often, such as on your bathroom mirror or in your car or wallet.  Identify the people, places, things, and activities that make you want to smoke (triggers) and avoid them. Make sure to take these actions:  Throw away all cigarettes at home, at work, and in your car.  Throw away smoking accessories, such as ashtrays and lighters.  Clean your car and make sure to empty the ashtray.  Clean your home, including curtains and carpets.  Tell your family, friends, and coworkers that you are quitting. Support from your loved ones can make quitting easier.  Talk with your health care provider about your options for quitting smoking.  Find out what treatment options are covered by your health insurance.  What strategies can I use to quit smoking?  Talk with your healthcare provider about different strategies to quit smoking. Some strategies include:  Quitting smoking altogether instead of gradually lessening how much you smoke over a period of time. Research shows that  quitting “cold turkey” is more successful than gradually quitting.  Attending in-person counseling to help you build problem-solving skills. You are more likely to have success in quitting if you attend several counseling sessions. Even short sessions of 10 minutes can be effective.  Finding resources and support systems that can help you to quit smoking and remain smoke-free after you quit. These resources are most helpful when you use them often. They can include:  Online chats with a counselor.  Telephone quitlines.  Printed self-help materials.  Support groups or group counseling.  Text messaging programs.  Mobile phone applications.  Taking medicines to help you quit smoking. (If you are pregnant or breastfeeding, talk with your health care provider first.) Some medicines contain nicotine and some do not. Both types of medicines help with cravings, but the medicines that include nicotine help to relieve withdrawal symptoms. Your health care provider may recommend:  Nicotine patches, gum, or lozenges.  Nicotine inhalers or sprays.  Non-nicotine medicine that is taken by mouth.  Talk with your health care provider about combining strategies, such as taking medicines while you are also receiving in-person counseling. Using these two strategies together makes you more likely to succeed in quitting than if you used either strategy on its own.  If you are pregnant or breastfeeding, talk with your health care provider about finding counseling or other support strategies to quit smoking. Do not take medicine to help you quit smoking unless told to do so by your health care provider.  What things can I do to make it easier to quit?  Quitting smoking might feel overwhelming at first, but there is a lot that you can do to make it easier. Take these important actions:  Reach out to your family and friends and ask that they support and encourage you during this time. Call telephone quitlines, reach out to support groups, or  work with a counselor for support.  Ask people who smoke to avoid smoking around you.  Avoid places that trigger you to smoke, such as bars, parties, or smoke-break areas at work.  Spend time around people who do not smoke.  Lessen stress in your life, because stress can be a smoking trigger for some people. To lessen stress, try:  Exercising regularly.  Deep-breathing exercises.  Yoga.  Meditating.  Performing a body scan. This involves closing your eyes, scanning your body from head to toe, and noticing which parts of your body are particularly tense. Purposefully relax the muscles in those areas.  Download or purchase mobile phone or tablet apps (applications) that can help you stick to your quit plan by providing reminders, tips, and encouragement. There are many free apps, such as QuitGuide from the CDC (Centers for Disease Control and Prevention). You can find other support for quitting smoking (smoking cessation) through smokefree.gov and other websites.  How will I feel when I quit smoking?  Within the first 24 hours of quitting smoking, you may start to feel some withdrawal symptoms. These symptoms are usually most noticeable 2-3 days after quitting, but they usually do not last beyond 2-3 weeks. Changes or symptoms that you might experience include:  Mood swings.  Restlessness, anxiety, or irritation.  Difficulty concentrating.  Dizziness.  Strong cravings for sugary foods in addition to nicotine.  Mild weight gain.  Constipation.  Nausea.  Coughing or a sore throat.  Changes in how your medicines work in your body.  A depressed mood.  Difficulty sleeping (insomnia).  After the first 2-3 weeks of quitting, you may start to notice more positive results, such as:  Improved sense of smell and taste.  Decreased coughing and sore throat.  Slower heart rate.  Lower blood pressure.  Clearer skin.  The ability to breathe more easily.  Fewer sick days.  Quitting smoking is very challenging for most people. Do not  get discouraged if you are not successful the first time. Some people need to make many attempts to quit before they achieve long-term success. Do your best to stick to your quit plan, and talk with your health care provider if you have any questions or concerns.  This information is not intended to replace advice given to you by your health care provider. Make sure you discuss any questions you have with your health care provider.  Document Released: 12/12/2002 Document Revised: 08/15/2017 Document Reviewed: 05/03/2016  Elsevier Interactive Patient Education © 2017 [employer]

## 2024-06-05 ENCOUNTER — ANESTHESIA EVENT (OUTPATIENT)
Dept: GASTROENTEROLOGY | Facility: HOSPITAL | Age: 84
End: 2024-06-05
Payer: MEDICARE

## 2024-06-05 ENCOUNTER — HOSPITAL ENCOUNTER (OUTPATIENT)
Facility: HOSPITAL | Age: 84
Setting detail: HOSPITAL OUTPATIENT SURGERY
Discharge: HOME | End: 2024-06-05
Attending: INTERNAL MEDICINE | Admitting: INTERNAL MEDICINE
Payer: MEDICARE

## 2024-06-05 ENCOUNTER — ANESTHESIA (OUTPATIENT)
Dept: GASTROENTEROLOGY | Facility: HOSPITAL | Age: 84
End: 2024-06-05
Payer: MEDICARE

## 2024-06-05 VITALS
BODY MASS INDEX: 20.38 KG/M2 | OXYGEN SATURATION: 100 % | RESPIRATION RATE: 25 BRPM | WEIGHT: 115 LBS | SYSTOLIC BLOOD PRESSURE: 123 MMHG | HEART RATE: 74 BPM | HEIGHT: 63 IN | TEMPERATURE: 97.5 F | DIASTOLIC BLOOD PRESSURE: 66 MMHG

## 2024-06-05 DIAGNOSIS — R13.19 ESOPHAGEAL DYSPHAGIA: ICD-10-CM

## 2024-06-05 PROCEDURE — 43235 EGD DIAGNOSTIC BRUSH WASH: CPT | Performed by: INTERNAL MEDICINE

## 2024-06-05 PROCEDURE — 25010000002 PROPOFOL 10 MG/ML EMULSION: Performed by: NURSE ANESTHETIST, CERTIFIED REGISTERED

## 2024-06-05 PROCEDURE — 25810000003 SODIUM CHLORIDE 0.9 % SOLUTION: Performed by: ANESTHESIOLOGY

## 2024-06-05 PROCEDURE — 43450 DILATE ESOPHAGUS 1/MULT PASS: CPT | Performed by: INTERNAL MEDICINE

## 2024-06-05 RX ORDER — LIDOCAINE HYDROCHLORIDE 10 MG/ML
0.5 INJECTION, SOLUTION EPIDURAL; INFILTRATION; INTRACAUDAL; PERINEURAL ONCE AS NEEDED
Status: DISCONTINUED | OUTPATIENT
Start: 2024-06-05 | End: 2024-06-05 | Stop reason: HOSPADM

## 2024-06-05 RX ORDER — SODIUM CHLORIDE 9 MG/ML
1000 INJECTION, SOLUTION INTRAVENOUS CONTINUOUS
Status: DISCONTINUED | OUTPATIENT
Start: 2024-06-05 | End: 2024-06-05 | Stop reason: HOSPADM

## 2024-06-05 RX ORDER — SODIUM CHLORIDE 0.9 % (FLUSH) 0.9 %
10 SYRINGE (ML) INJECTION AS NEEDED
Status: DISCONTINUED | OUTPATIENT
Start: 2024-06-05 | End: 2024-06-05 | Stop reason: HOSPADM

## 2024-06-05 RX ORDER — SODIUM CHLORIDE 9 MG/ML
500 INJECTION, SOLUTION INTRAVENOUS CONTINUOUS PRN
Status: DISCONTINUED | OUTPATIENT
Start: 2024-06-05 | End: 2024-06-05 | Stop reason: HOSPADM

## 2024-06-05 RX ORDER — PROPOFOL 10 MG/ML
VIAL (ML) INTRAVENOUS AS NEEDED
Status: DISCONTINUED | OUTPATIENT
Start: 2024-06-05 | End: 2024-06-05 | Stop reason: SURG

## 2024-06-05 RX ORDER — LIDOCAINE HYDROCHLORIDE 20 MG/ML
INJECTION, SOLUTION EPIDURAL; INFILTRATION; INTRACAUDAL; PERINEURAL AS NEEDED
Status: DISCONTINUED | OUTPATIENT
Start: 2024-06-05 | End: 2024-06-05 | Stop reason: SURG

## 2024-06-05 RX ADMIN — LIDOCAINE HYDROCHLORIDE 100 MG: 20 INJECTION, SOLUTION EPIDURAL; INFILTRATION; INTRACAUDAL; PERINEURAL at 11:21

## 2024-06-05 RX ADMIN — PROPOFOL 100 MG: 10 INJECTION, EMULSION INTRAVENOUS at 11:21

## 2024-06-05 RX ADMIN — SODIUM CHLORIDE 500 ML: 9 INJECTION, SOLUTION INTRAVENOUS at 09:54

## 2024-06-05 RX ADMIN — PROPOFOL 25 MG: 10 INJECTION, EMULSION INTRAVENOUS at 11:24

## 2024-06-05 NOTE — ANESTHESIA PREPROCEDURE EVALUATION
Anesthesia Evaluation     Patient summary reviewed   history of anesthetic complications:  prolonged sedation  NPO Solid Status: > 8 hours             Airway   Mallampati: II  Dental      Pulmonary    (+) ,home oxygen, sleep apnea on CPAP  Cardiovascular - negative cardio ROS  Exercise tolerance: excellent (>7 METS)        Neuro/Psych- negative ROS  GI/Hepatic/Renal/Endo - negative ROS     Musculoskeletal     Abdominal    Substance History      OB/GYN          Other                    Anesthesia Plan    ASA 3     MAC       Anesthetic plan, risks, benefits, and alternatives have been provided, discussed and informed consent has been obtained with: patient.    CODE STATUS:

## 2024-06-05 NOTE — ANESTHESIA POSTPROCEDURE EVALUATION
"Patient: Olive Bowman    Procedure Summary       Date: 06/05/24 Room / Location: [facility] ENDOSCOPY 6 / [facility]    Anesthesia Start: 1119 Anesthesia Stop: 1130    Procedure: ESOPHAGOGASTRODUODENOSCOPY WITH ANESTHESIA Diagnosis:       Esophageal dysphagia      (Esophageal dysphagia [R13.19])    Surgeons: Kun Cavanaugh MD Provider: GUICHO Szymanski CRNA    Anesthesia Type: MAC ASA Status: 3            Anesthesia Type: MAC    Vitals  Vitals Value Taken Time   /66 06/05/24 1146   Temp     Pulse 72 06/05/24 1148   Resp 25 06/05/24 1145   SpO2 98 % 06/05/24 1148   Vitals shown include unfiled device data.        Post Anesthesia Care and Evaluation    Patient location during evaluation: PACU  Patient participation: complete - patient participated  Level of consciousness: awake and alert  Pain management: adequate    Airway patency: patent  Anesthetic complications: No anesthetic complications    Cardiovascular status: acceptable  Respiratory status: acceptable  Hydration status: acceptable    Comments: Blood pressure 123/66, pulse 74, temperature 97.5 °F (36.4 °C), resp. rate 25, height 160 cm (63\"), weight 52.2 kg (115 lb), SpO2 100%, not currently breastfeeding.    Pt discharged from PACU based on florencio score >8    "

## 2024-09-26 ENCOUNTER — TRANSCRIBE ORDERS (OUTPATIENT)
Dept: ADMINISTRATIVE | Facility: HOSPITAL | Age: 84
End: 2024-09-26
Payer: MEDICARE

## 2024-09-26 DIAGNOSIS — Z12.31 ENCOUNTER FOR SCREENING MAMMOGRAM FOR MALIGNANT NEOPLASM OF BREAST: Primary | ICD-10-CM

## 2024-09-26 DIAGNOSIS — Z78.0 ASYMPTOMATIC MENOPAUSAL STATE: Primary | ICD-10-CM

## 2024-12-30 ENCOUNTER — HOSPITAL ENCOUNTER (OUTPATIENT)
Dept: MAMMOGRAPHY | Facility: HOSPITAL | Age: 84
Discharge: HOME | End: 2024-12-30
Payer: MEDICARE

## 2024-12-30 ENCOUNTER — HOSPITAL ENCOUNTER (OUTPATIENT)
Dept: BONE DENSITY | Facility: HOSPITAL | Age: 84
Discharge: HOME | End: 2024-12-30
Payer: MEDICARE

## 2024-12-30 DIAGNOSIS — Z12.31 ENCOUNTER FOR SCREENING MAMMOGRAM FOR MALIGNANT NEOPLASM OF BREAST: ICD-10-CM

## 2024-12-30 DIAGNOSIS — Z78.0 ASYMPTOMATIC MENOPAUSAL STATE: ICD-10-CM

## 2024-12-30 PROCEDURE — 77067 SCR MAMMO BI INCL CAD: CPT

## 2024-12-30 PROCEDURE — 77063 BREAST TOMOSYNTHESIS BI: CPT

## 2024-12-30 PROCEDURE — 77080 DXA BONE DENSITY AXIAL: CPT

## (undated) DEVICE — CUFF,BP,DISP,1 TUBE,ADULT,HP: Brand: MEDLINE

## (undated) DEVICE — SUT SILK 2/0 SH CR8 18IN CR8 C012D

## (undated) DEVICE — SUT PROLN 1 CT1 30IN 8425H

## (undated) DEVICE — ANTIBACTERIAL UNDYED BRAIDED (POLYGLACTIN 910), SYNTHETIC ABSORBABLE SUTURE: Brand: COATED VICRYL

## (undated) DEVICE — Device: Brand: DEFENDO AIR/WATER/SUCTION AND BIOPSY VALVE

## (undated) DEVICE — SUT PDS LP 1 TP1 96IN VIO PDP880GA

## (undated) DEVICE — PAD GRND REM POLYHESIVE A/ DISP

## (undated) DEVICE — SPNG GZ WOVN 4X4IN 12PLY 10/BX STRL

## (undated) DEVICE — YANKAUER,BULB TIP WITH VENT: Brand: ARGYLE

## (undated) DEVICE — TB FEED GASTRO MIC 22F7TO10ML

## (undated) DEVICE — DRSNG SURESITE WNDW 4X4.5

## (undated) DEVICE — BARRIER, ABSORBABLE, ADHESION: Brand: SEPRAFILM®

## (undated) DEVICE — SENSR O2 OXIMAX FNGR A/ 18IN NONSTR

## (undated) DEVICE — THE CHANNEL CLEANING BRUSH IS A NYLON FLEXI BRUSH ATTACHED TO A FLEXIBLE PLASTIC SHEATH DESIGNED TO SAFELY REMOVE DEBRIS FROM FLEXIBLE ENDOSCOPES.

## (undated) DEVICE — TBG SMPL FLTR LINE NASL 02/C02 A/ BX/100

## (undated) DEVICE — PK TURNOVER RM ADV

## (undated) DEVICE — 3M™ STERI-STRIP™ REINFORCED ADHESIVE SKIN CLOSURES, R1547, 1/2 IN X 4 IN (12 MM X 100 MM), 6 STRIPS/ENVELOPE: Brand: 3M™ STERI-STRIP™

## (undated) DEVICE — GLV SURG BIOGEL LTX PF 6 1/2

## (undated) DEVICE — ELECTRD BLD EDGE/INSUL1P 2.4X5.1MM STRL

## (undated) DEVICE — ENDOGATOR AUXILIARY WATER JET CONNECTOR: Brand: ENDOGATOR

## (undated) DEVICE — FRCP BIOP COLD ENDOJAW ALLGTR W/NDL 2.8X2300MM BLU

## (undated) DEVICE — CONMED SCOPE SAVER BITE BLOCK, 20X27 MM: Brand: SCOPE SAVER

## (undated) DEVICE — PAD MAJOR: Brand: MEDLINE INDUSTRIES, INC.

## (undated) DEVICE — TRY PREP SCRB VAG PVP